# Patient Record
Sex: FEMALE | Race: WHITE | Employment: UNEMPLOYED | URBAN - METROPOLITAN AREA
[De-identification: names, ages, dates, MRNs, and addresses within clinical notes are randomized per-mention and may not be internally consistent; named-entity substitution may affect disease eponyms.]

---

## 2017-02-20 RX ORDER — LEVOTHYROXINE SODIUM 0.07 MG/1
TABLET ORAL
Qty: 30 TABLET | Refills: 2 | Status: SHIPPED | OUTPATIENT
Start: 2017-02-20 | End: 2017-05-24 | Stop reason: SDUPTHER

## 2017-05-24 RX ORDER — LEVOTHYROXINE SODIUM 0.07 MG/1
TABLET ORAL
Qty: 30 TABLET | Refills: 2 | Status: SHIPPED | OUTPATIENT
Start: 2017-05-24 | End: 2017-08-22 | Stop reason: SDUPTHER

## 2017-06-06 ENCOUNTER — OFFICE VISIT (OUTPATIENT)
Dept: FAMILY MEDICINE CLINIC | Age: 57
End: 2017-06-06

## 2017-06-06 VITALS
HEART RATE: 76 BPM | WEIGHT: 138.8 LBS | SYSTOLIC BLOOD PRESSURE: 124 MMHG | OXYGEN SATURATION: 99 % | DIASTOLIC BLOOD PRESSURE: 72 MMHG | HEIGHT: 63 IN | BODY MASS INDEX: 24.59 KG/M2

## 2017-06-06 DIAGNOSIS — E03.9 HYPOTHYROIDISM, UNSPECIFIED TYPE: Primary | ICD-10-CM

## 2017-06-06 DIAGNOSIS — R53.1 WEAKNESS: ICD-10-CM

## 2017-06-06 PROCEDURE — 36415 COLL VENOUS BLD VENIPUNCTURE: CPT | Performed by: FAMILY MEDICINE

## 2017-06-06 PROCEDURE — 99214 OFFICE O/P EST MOD 30 MIN: CPT | Performed by: FAMILY MEDICINE

## 2017-06-07 LAB
ESTIMATED AVERAGE GLUCOSE: 116.9 MG/DL
HBA1C MFR BLD: 5.7 %
T4 FREE: 1.5 NG/DL (ref 0.9–1.8)
TSH REFLEX FT4: 1 UIU/ML (ref 0.27–4.2)

## 2017-08-22 RX ORDER — LEVOTHYROXINE SODIUM 0.07 MG/1
TABLET ORAL
Qty: 30 TABLET | Refills: 1 | Status: SHIPPED | OUTPATIENT
Start: 2017-08-22 | End: 2017-10-18 | Stop reason: SDUPTHER

## 2017-10-18 RX ORDER — LEVOTHYROXINE SODIUM 0.07 MG/1
TABLET ORAL
Qty: 30 TABLET | Refills: 1 | Status: SHIPPED | OUTPATIENT
Start: 2017-10-18 | End: 2017-12-15 | Stop reason: SDUPTHER

## 2017-10-18 NOTE — TELEPHONE ENCOUNTER
Patient called and is requesting a refill on the Levothyroxine. Please call patient when rx is sent to [address] [place] [address] in [facility]. Patient is aware that Dr. Alanna Hood is not in the office today.     Thanks

## 2017-12-15 RX ORDER — LEVOTHYROXINE SODIUM 0.07 MG/1
TABLET ORAL
Qty: 30 TABLET | Refills: 5 | Status: SHIPPED | OUTPATIENT
Start: 2017-12-15 | End: 2018-06-15 | Stop reason: SDUPTHER

## 2018-06-18 RX ORDER — LEVOTHYROXINE SODIUM 0.07 MG/1
TABLET ORAL
Qty: 30 TABLET | Refills: 1 | Status: SHIPPED | OUTPATIENT
Start: 2018-06-18 | End: 2018-08-13 | Stop reason: SDUPTHER

## 2018-07-02 ENCOUNTER — TELEPHONE (OUTPATIENT)
Dept: FAMILY MEDICINE CLINIC | Age: 58
End: 2018-07-02

## 2018-07-02 ENCOUNTER — OFFICE VISIT (OUTPATIENT)
Dept: FAMILY MEDICINE CLINIC | Age: 58
End: 2018-07-02

## 2018-07-02 VITALS
WEIGHT: 134 LBS | SYSTOLIC BLOOD PRESSURE: 112 MMHG | TEMPERATURE: 97.4 F | HEART RATE: 80 BPM | BODY MASS INDEX: 23.74 KG/M2 | DIASTOLIC BLOOD PRESSURE: 70 MMHG

## 2018-07-02 DIAGNOSIS — R20.8 LOCALIZED SKIN TENDERNESS: ICD-10-CM

## 2018-07-02 DIAGNOSIS — E03.9 HYPOTHYROIDISM, UNSPECIFIED TYPE: ICD-10-CM

## 2018-07-02 DIAGNOSIS — F32.A DEPRESSION, UNSPECIFIED DEPRESSION TYPE: ICD-10-CM

## 2018-07-02 DIAGNOSIS — L30.9 DERMATITIS: ICD-10-CM

## 2018-07-02 DIAGNOSIS — Z86.19 HISTORY OF SHINGLES: Primary | ICD-10-CM

## 2018-07-02 PROCEDURE — 99214 OFFICE O/P EST MOD 30 MIN: CPT | Performed by: NURSE PRACTITIONER

## 2018-07-02 RX ORDER — TRIAMCINOLONE ACETONIDE 55 UG/1
2 SPRAY, METERED NASAL DAILY
COMMUNITY
End: 2022-10-27

## 2018-07-02 RX ORDER — VALACYCLOVIR HYDROCHLORIDE 1 G/1
1000 TABLET, FILM COATED ORAL 3 TIMES DAILY
Qty: 21 TABLET | Refills: 2 | Status: SHIPPED | OUTPATIENT
Start: 2018-07-02 | End: 2018-07-09

## 2018-07-02 RX ORDER — DESONIDE 0.5 MG/G
CREAM TOPICAL
Qty: 15 G | Refills: 1 | Status: SHIPPED | OUTPATIENT
Start: 2018-07-02 | End: 2019-04-30 | Stop reason: SDUPTHER

## 2018-07-02 NOTE — TELEPHONE ENCOUNTER
Patient is calling stating she is getting shingles around her left rib area, painful and sore to touch, patient wants to know if ou can call in some pain medication

## 2018-07-03 LAB — TSH SERPL DL<=0.05 MIU/L-ACNC: 0.8 UIU/ML (ref 0.27–4.2)

## 2018-07-09 ENCOUNTER — TELEPHONE (OUTPATIENT)
Dept: FAMILY MEDICINE CLINIC | Age: 58
End: 2018-07-09

## 2018-07-09 NOTE — TELEPHONE ENCOUNTER
Patient is calling wanting to know if she should continue the valtrex that was prescribed last week for her shingles? She is finished with the script bit it has refills? Should she get it refilled?  She states she is feels some what better

## 2018-08-13 DIAGNOSIS — E03.9 HYPOTHYROIDISM, UNSPECIFIED TYPE: Primary | ICD-10-CM

## 2018-08-13 RX ORDER — LEVOTHYROXINE SODIUM 0.07 MG/1
TABLET ORAL
Qty: 30 TABLET | Refills: 1 | Status: SHIPPED | OUTPATIENT
Start: 2018-08-13 | End: 2018-08-14 | Stop reason: SDUPTHER

## 2018-08-14 RX ORDER — LEVOTHYROXINE SODIUM 0.07 MG/1
TABLET ORAL
Qty: 30 TABLET | Refills: 1 | Status: SHIPPED | OUTPATIENT
Start: 2018-08-14 | End: 2018-10-15 | Stop reason: SDUPTHER

## 2018-10-15 DIAGNOSIS — E03.9 HYPOTHYROIDISM, UNSPECIFIED TYPE: ICD-10-CM

## 2018-10-15 RX ORDER — LEVOTHYROXINE SODIUM 0.07 MG/1
TABLET ORAL
Qty: 30 TABLET | Refills: 1 | Status: SHIPPED | OUTPATIENT
Start: 2018-10-15 | End: 2018-12-10 | Stop reason: SDUPTHER

## 2018-10-15 NOTE — TELEPHONE ENCOUNTER
Patient called and is requesting a refill on her prescription of Levothyroxine 75 mcgs. Please send rx to [facility] in [facility].       Last refilled   08.14.18  Last seen     07.02.18    Next appt     Not scheduled

## 2018-12-10 DIAGNOSIS — E03.9 HYPOTHYROIDISM, UNSPECIFIED TYPE: ICD-10-CM

## 2018-12-10 RX ORDER — LEVOTHYROXINE SODIUM 0.07 MG/1
TABLET ORAL
Qty: 30 TABLET | Refills: 3 | Status: SHIPPED | OUTPATIENT
Start: 2018-12-10 | End: 2019-04-08 | Stop reason: SDUPTHER

## 2018-12-10 NOTE — TELEPHONE ENCOUNTER
Patient called and is requesting a refill on her prescription of Levothyroxine 75 mcg. Patient would like the prescription sent to [facility] in [facility]. Patient is aware of the 24 to 48 hour turnaround time on prescription refills.       Last refilled    10.15.18  Last seen       07.02.18

## 2019-03-21 ENCOUNTER — TELEPHONE (OUTPATIENT)
Dept: FAMILY MEDICINE CLINIC | Age: 59
End: 2019-03-21

## 2019-03-21 DIAGNOSIS — D22.9 SKIN MOLE: ICD-10-CM

## 2019-03-21 DIAGNOSIS — L30.9 ACUTE ECZEMA: ICD-10-CM

## 2019-03-21 DIAGNOSIS — L30.9 DERMATITIS: Primary | ICD-10-CM

## 2019-03-26 ENCOUNTER — HOSPITAL ENCOUNTER (OUTPATIENT)
Dept: MAMMOGRAPHY | Age: 59
Discharge: HOME | End: 2019-03-26
Payer: COMMERCIAL

## 2019-03-26 DIAGNOSIS — Z12.31 ENCOUNTER FOR SCREENING MAMMOGRAM FOR BREAST CANCER: ICD-10-CM

## 2019-03-26 PROCEDURE — 77063 BREAST TOMOSYNTHESIS BI: CPT

## 2019-04-02 ENCOUNTER — TELEPHONE (OUTPATIENT)
Dept: FAMILY MEDICINE CLINIC | Age: 59
End: 2019-04-02

## 2019-04-08 DIAGNOSIS — E03.9 HYPOTHYROIDISM, UNSPECIFIED TYPE: ICD-10-CM

## 2019-04-08 RX ORDER — LEVOTHYROXINE SODIUM 0.07 MG/1
TABLET ORAL
Qty: 30 TABLET | Refills: 3 | Status: SHIPPED | OUTPATIENT
Start: 2019-04-08 | End: 2019-08-05 | Stop reason: SDUPTHER

## 2019-04-08 NOTE — TELEPHONE ENCOUNTER
Patient request for Levothyroxine 75 mcgs. Please send to [address] in [facility].

## 2019-04-15 ENCOUNTER — TELEPHONE (OUTPATIENT)
Dept: FAMILY MEDICINE CLINIC | Age: 59
End: 2019-04-15

## 2019-04-15 NOTE — TELEPHONE ENCOUNTER
Patient called and is requesting that [facility] call her. Patient states that the dermatologist Dorina Benavidez is requesting additional information regarding the mole that the patient needs removed. Patient wants to discuss this with [facility].

## 2019-04-30 ENCOUNTER — OFFICE VISIT (OUTPATIENT)
Dept: FAMILY MEDICINE CLINIC | Age: 59
End: 2019-04-30
Payer: COMMERCIAL

## 2019-04-30 VITALS
HEART RATE: 85 BPM | WEIGHT: 136 LBS | OXYGEN SATURATION: 98 % | BODY MASS INDEX: 24.1 KG/M2 | SYSTOLIC BLOOD PRESSURE: 112 MMHG | DIASTOLIC BLOOD PRESSURE: 60 MMHG | HEIGHT: 63 IN

## 2019-04-30 DIAGNOSIS — L30.9 DERMATITIS: ICD-10-CM

## 2019-04-30 DIAGNOSIS — L30.9 ECZEMA, UNSPECIFIED TYPE: ICD-10-CM

## 2019-04-30 DIAGNOSIS — D22.9 NEVUS, ATYPICAL: Primary | ICD-10-CM

## 2019-04-30 PROCEDURE — 99213 OFFICE O/P EST LOW 20 MIN: CPT | Performed by: FAMILY MEDICINE

## 2019-04-30 PROCEDURE — 11307 SHAVE SKIN LESION 1.1-2.0 CM: CPT | Performed by: FAMILY MEDICINE

## 2019-04-30 RX ORDER — DESONIDE 0.5 MG/G
CREAM TOPICAL
Qty: 15 G | Refills: 1 | Status: SHIPPED | OUTPATIENT
Start: 2019-04-30 | End: 2020-12-14

## 2019-04-30 RX ORDER — TRIAMCINOLONE ACETONIDE 1 MG/G
CREAM TOPICAL
Qty: 60 G | Refills: 0 | Status: SHIPPED | OUTPATIENT
Start: 2019-04-30 | End: 2020-11-06

## 2019-05-08 NOTE — PROGRESS NOTES
2019     Remi Rockwell (:  1960) is a 63 y.o. female, here for evaluation of the following medical concerns:    Remi Rockwell is a 63 y.o. female. Patient presents with:  Mole        The patients PMH, surgical history, family history, medications, allergies were all reviewed and updated as appropriate today. Rash   This is a chronic problem. The current episode started more than 1 year ago. The problem is unchanged. The rash is diffuse. She was exposed to nothing. Pertinent negatives include no congestion. Past treatments include nothing. The treatment provided no relief. Her past medical history is significant for eczema. Review of Systems   HENT: Negative for congestion. Skin: Positive for rash. Prior to Visit Medications    Medication Sig Taking? Authorizing Provider   desonide (DESOWEN) 0.05 % cream Apply topically 2 times daily to patch on scalp Yes Kennedy Holman MD   triamcinolone (KENALOG) 0.1 % cream Apply topically 2 times daily. Yes Kennedy Holman MD   levothyroxine (SYNTHROID) 75 MCG tablet TAKE ONE TABLET BY MOUTH DAILY Yes Kennedy Holman MD   triamcinolone (NASACORT ALLERGY 24HR) 55 MCG/ACT nasal inhaler 2 sprays by Nasal route daily Yes Historical Provider, MD   sertraline (ZOLOFT) 50 MG tablet Take 1 tablet by mouth nightly 1/2 tab for first 7 days then one daily after  MICHEL Killian - LIZETTE        Social History     Tobacco Use    Smoking status: Never Smoker    Smokeless tobacco: Never Used   Substance Use Topics    Alcohol use: No        Vitals:    19 1432   BP: 112/60   Pulse: 85   SpO2: 98%   Weight: 136 lb (61.7 kg)   Height: 5' 3\" (1.6 m)     Estimated body mass index is 24.09 kg/m² as calculated from the following:    Height as of this encounter: 5' 3\" (1.6 m). Weight as of this encounter: 136 lb (61.7 kg). Physical Exam   Constitutional: She is oriented to person, place, and time.  She appears well-developed and signs of infection were reviewed. 3. Recommended that the patient use OTC acetaminophen as needed for pain. 4. Return in 6 weeks. ASSESSMENT/PLAN:  1. Dermatitis    - desonide (DESOWEN) 0.05 % cream; Apply topically 2 times daily to patch on scalp  Dispense: 15 g; Refill: 1    2. Eczema    - triamcinolone (KENALOG) 0.1 % cream; Apply topically 2 times daily. Dispense: 60 g; Refill: 0    3. Nevus, atypical    - Surgical Pathology      No follow-ups on file. An electronic signature was used to authenticate this note.     --Prabha Bean MD on 5/8/2019 at 8:59 AM

## 2019-08-05 ENCOUNTER — TELEPHONE (OUTPATIENT)
Dept: FAMILY MEDICINE CLINIC | Age: 59
End: 2019-08-05

## 2019-08-05 DIAGNOSIS — E03.9 HYPOTHYROIDISM, UNSPECIFIED TYPE: ICD-10-CM

## 2019-08-05 RX ORDER — LEVOTHYROXINE SODIUM 0.07 MG/1
TABLET ORAL
Qty: 30 TABLET | Refills: 0 | Status: SHIPPED | OUTPATIENT
Start: 2019-08-05 | End: 2019-09-04 | Stop reason: SDUPTHER

## 2019-08-14 DIAGNOSIS — F32.A DEPRESSION, UNSPECIFIED DEPRESSION TYPE: ICD-10-CM

## 2019-09-04 RX ORDER — LEVOTHYROXINE SODIUM 0.07 MG/1
TABLET ORAL
Qty: 30 TABLET | Refills: 1 | Status: SHIPPED | OUTPATIENT
Start: 2019-09-04 | End: 2019-11-07 | Stop reason: SDUPTHER

## 2019-11-07 RX ORDER — LEVOTHYROXINE SODIUM 0.07 MG/1
TABLET ORAL
Qty: 30 TABLET | Refills: 11 | Status: SHIPPED | OUTPATIENT
Start: 2019-11-07 | End: 2020-11-02 | Stop reason: SDUPTHER

## 2019-11-07 NOTE — TELEPHONE ENCOUNTER
Patient requesting a refill on Synthroid    [address] seen:  4.30.19  Future visit:  12.11.19

## 2019-12-11 ENCOUNTER — OFFICE VISIT (OUTPATIENT)
Dept: FAMILY MEDICINE CLINIC | Age: 59
End: 2019-12-11
Payer: COMMERCIAL

## 2019-12-11 VITALS
HEART RATE: 87 BPM | WEIGHT: 135 LBS | BODY MASS INDEX: 23.91 KG/M2 | OXYGEN SATURATION: 98 % | DIASTOLIC BLOOD PRESSURE: 64 MMHG | SYSTOLIC BLOOD PRESSURE: 122 MMHG

## 2019-12-11 DIAGNOSIS — Z00.00 WELL ADULT EXAM: Primary | ICD-10-CM

## 2019-12-11 DIAGNOSIS — Z12.11 SCREENING FOR COLON CANCER: ICD-10-CM

## 2019-12-11 DIAGNOSIS — Z23 NEED FOR PROPHYLACTIC VACCINATION AND INOCULATION AGAINST INFLUENZA: ICD-10-CM

## 2019-12-11 LAB
A/G RATIO: 1.6 (ref 1.1–2.2)
ALBUMIN SERPL-MCNC: 4.5 G/DL (ref 3.4–5)
ALP BLD-CCNC: 67 U/L (ref 40–129)
ALT SERPL-CCNC: 22 U/L (ref 10–40)
ANION GAP SERPL CALCULATED.3IONS-SCNC: 14 MMOL/L (ref 3–16)
AST SERPL-CCNC: 19 U/L (ref 15–37)
BILIRUB SERPL-MCNC: 1 MG/DL (ref 0–1)
BUN BLDV-MCNC: 15 MG/DL (ref 7–20)
CALCIUM SERPL-MCNC: 9.9 MG/DL (ref 8.3–10.6)
CHLORIDE BLD-SCNC: 104 MMOL/L (ref 99–110)
CHOLESTEROL, TOTAL: 195 MG/DL (ref 0–199)
CO2: 27 MMOL/L (ref 21–32)
CREAT SERPL-MCNC: 0.7 MG/DL (ref 0.6–1.1)
GFR AFRICAN AMERICAN: >60
GFR NON-AFRICAN AMERICAN: >60
GLOBULIN: 2.8 G/DL
GLUCOSE BLD-MCNC: 90 MG/DL (ref 70–99)
HDLC SERPL-MCNC: 52 MG/DL (ref 40–60)
LDL CHOLESTEROL CALCULATED: 130 MG/DL
POTASSIUM SERPL-SCNC: 4.3 MMOL/L (ref 3.5–5.1)
SODIUM BLD-SCNC: 145 MMOL/L (ref 136–145)
TOTAL PROTEIN: 7.3 G/DL (ref 6.4–8.2)
TRIGL SERPL-MCNC: 63 MG/DL (ref 0–150)
TSH REFLEX: 0.47 UIU/ML (ref 0.27–4.2)
VLDLC SERPL CALC-MCNC: 13 MG/DL

## 2019-12-11 PROCEDURE — 36415 COLL VENOUS BLD VENIPUNCTURE: CPT | Performed by: FAMILY MEDICINE

## 2019-12-11 PROCEDURE — 90471 IMMUNIZATION ADMIN: CPT | Performed by: FAMILY MEDICINE

## 2019-12-11 PROCEDURE — 90686 IIV4 VACC NO PRSV 0.5 ML IM: CPT | Performed by: FAMILY MEDICINE

## 2019-12-11 PROCEDURE — 99396 PREV VISIT EST AGE 40-64: CPT | Performed by: FAMILY MEDICINE

## 2019-12-11 ASSESSMENT — ENCOUNTER SYMPTOMS
SHORTNESS OF BREATH: 0
EYE PAIN: 0
DIARRHEA: 0
TROUBLE SWALLOWING: 0
BACK PAIN: 0
COLOR CHANGE: 0
CHEST TIGHTNESS: 0
CONSTIPATION: 0
RHINORRHEA: 0

## 2019-12-11 ASSESSMENT — PATIENT HEALTH QUESTIONNAIRE - PHQ9
SUM OF ALL RESPONSES TO PHQ QUESTIONS 1-9: 1
2. FEELING DOWN, DEPRESSED OR HOPELESS: 1
1. LITTLE INTEREST OR PLEASURE IN DOING THINGS: 0
SUM OF ALL RESPONSES TO PHQ9 QUESTIONS 1 & 2: 1
SUM OF ALL RESPONSES TO PHQ QUESTIONS 1-9: 1

## 2019-12-12 LAB
ESTIMATED AVERAGE GLUCOSE: 108.3 MG/DL
HBA1C MFR BLD: 5.4 %

## 2020-04-10 ENCOUNTER — TELEPHONE (OUTPATIENT)
Dept: FAMILY MEDICINE CLINIC | Age: 60
End: 2020-04-10

## 2020-04-10 RX ORDER — SULFAMETHOXAZOLE AND TRIMETHOPRIM 800; 160 MG/1; MG/1
1 TABLET ORAL 2 TIMES DAILY
Qty: 14 TABLET | Refills: 0 | Status: SHIPPED | OUTPATIENT
Start: 2020-04-10 | End: 2020-04-17

## 2020-04-24 ENCOUNTER — VIRTUAL VISIT (OUTPATIENT)
Dept: FAMILY MEDICINE CLINIC | Age: 60
End: 2020-04-24
Payer: COMMERCIAL

## 2020-04-24 ENCOUNTER — PATIENT MESSAGE (OUTPATIENT)
Dept: FAMILY MEDICINE CLINIC | Age: 60
End: 2020-04-24

## 2020-04-24 VITALS — BODY MASS INDEX: 23.04 KG/M2 | HEIGHT: 63 IN | WEIGHT: 130 LBS

## 2020-04-24 PROCEDURE — 99213 OFFICE O/P EST LOW 20 MIN: CPT | Performed by: NURSE PRACTITIONER

## 2020-04-24 RX ORDER — PREDNISONE 20 MG/1
TABLET ORAL
Qty: 18 TABLET | Refills: 0 | Status: SHIPPED | OUTPATIENT
Start: 2020-04-24 | End: 2020-11-06

## 2020-04-24 ASSESSMENT — PATIENT HEALTH QUESTIONNAIRE - PHQ9
2. FEELING DOWN, DEPRESSED OR HOPELESS: 0
SUM OF ALL RESPONSES TO PHQ QUESTIONS 1-9: 0
1. LITTLE INTEREST OR PLEASURE IN DOING THINGS: 0
SUM OF ALL RESPONSES TO PHQ QUESTIONS 1-9: 0
SUM OF ALL RESPONSES TO PHQ9 QUESTIONS 1 & 2: 0

## 2020-04-24 ASSESSMENT — ENCOUNTER SYMPTOMS
EYES NEGATIVE: 1
RESPIRATORY NEGATIVE: 1
COLOR CHANGE: 0

## 2020-04-24 NOTE — PATIENT INSTRUCTIONS
lightheaded or suddenly feel weak, confused, or restless.    Call your doctor now or seek immediate medical care if:    · You have symptoms of an allergic reaction, such as:  ? A rash or hives (raised, red areas on the skin). ? Itching. ? Swelling. ? Belly pain, nausea, or vomiting.     · You get hives after you start a new medicine.     · Hives have not gone away after 24 hours.    Watch closely for changes in your health, and be sure to contact your doctor if:    · You do not get better as expected. Where can you learn more? Go to [email or web address] and sign in to your [employer] account. Enter A767 in the FiFully box to learn more about \"Hives: Care Instructions. \"     If you do not have an account, please click on the \"Sign Up Now\" link. Current as of: June 26, 2019Content Version: 12.4  © 1082-0044 Healthwise, Incorporated. Care instructions adapted under license by [facility] ([facility]). If you have questions about a medical condition or this instruction, always ask your healthcare professional. [place] [address] any warranty or liability for your use of this information.

## 2020-11-02 RX ORDER — LEVOTHYROXINE SODIUM 0.07 MG/1
TABLET ORAL
Qty: 30 TABLET | Refills: 2 | Status: SHIPPED | OUTPATIENT
Start: 2020-11-02 | End: 2021-01-27 | Stop reason: SDUPTHER

## 2020-11-06 ENCOUNTER — OFFICE VISIT (OUTPATIENT)
Dept: FAMILY MEDICINE CLINIC | Age: 60
End: 2020-11-06
Payer: COMMERCIAL

## 2020-11-06 VITALS
DIASTOLIC BLOOD PRESSURE: 70 MMHG | HEART RATE: 72 BPM | BODY MASS INDEX: 24.09 KG/M2 | OXYGEN SATURATION: 98 % | WEIGHT: 136 LBS | TEMPERATURE: 97.3 F | SYSTOLIC BLOOD PRESSURE: 116 MMHG

## 2020-11-06 LAB
BILIRUBIN, POC: NEGATIVE
BLOOD URINE, POC: NORMAL
CLARITY, POC: CLEAR
COLOR, POC: YELLOW
GLUCOSE URINE, POC: NEGATIVE
KETONES, POC: NEGATIVE
LEUKOCYTE EST, POC: NEGATIVE
NITRITE, POC: NEGATIVE
PH, POC: 5
PROTEIN, POC: NEGATIVE
SPECIFIC GRAVITY, POC: >=1.03
UROBILINOGEN, POC: NORMAL

## 2020-11-06 PROCEDURE — 81002 URINALYSIS NONAUTO W/O SCOPE: CPT | Performed by: NURSE PRACTITIONER

## 2020-11-06 PROCEDURE — 90471 IMMUNIZATION ADMIN: CPT | Performed by: NURSE PRACTITIONER

## 2020-11-06 PROCEDURE — 90688 IIV4 VACCINE SPLT 0.5 ML IM: CPT | Performed by: NURSE PRACTITIONER

## 2020-11-06 PROCEDURE — 99213 OFFICE O/P EST LOW 20 MIN: CPT | Performed by: NURSE PRACTITIONER

## 2020-11-06 RX ORDER — CEPHALEXIN 500 MG/1
500 CAPSULE ORAL 2 TIMES DAILY
Qty: 14 CAPSULE | Refills: 0 | Status: SHIPPED | OUTPATIENT
Start: 2020-11-06 | End: 2020-11-13

## 2020-11-06 RX ORDER — TRIAMCINOLONE ACETONIDE 5 MG/G
CREAM TOPICAL
Qty: 15 G | Refills: 1 | Status: SHIPPED | OUTPATIENT
Start: 2020-11-06 | End: 2020-11-13

## 2020-11-06 RX ORDER — TAMSULOSIN HYDROCHLORIDE 0.4 MG/1
0.4 CAPSULE ORAL DAILY
Qty: 30 CAPSULE | Refills: 0 | Status: SHIPPED | OUTPATIENT
Start: 2020-11-06 | End: 2020-12-14

## 2020-11-06 ASSESSMENT — ENCOUNTER SYMPTOMS
SHORTNESS OF BREATH: 0
GASTROINTESTINAL NEGATIVE: 1
COUGH: 0
WHEEZING: 0

## 2020-11-06 NOTE — PROGRESS NOTES
2020     Mary Salmeron (:  1960) is a 63 y.o. female, here for evaluation of the following medical concerns:    HPI  Burning on her lower right groin with urination. She states she has had some low back pain for a couple days. She states the pain starts on her mid right side of her back and travels around to the front of her abdomen. She states that is were it burns. She denies urinary frequency or urgency. She feels she may be going to the bathroom more but she is trying to \"flush\" her system. She denies any history of kidney stone but reports she has been told she might have one. Dermatitis. She has concerns with a dry patch on the back of her neck on her hair line. She has been using some cream given by Dr. Arnold Monteiro but she is not sure of the name of it. It helps some but states it is there constantly. Review of Systems   Constitutional: Negative. HENT: Negative. Respiratory: Negative for cough, shortness of breath and wheezing. Cardiovascular: Negative. Gastrointestinal: Negative. Genitourinary: Positive for dysuria and frequency. Negative for urgency. Musculoskeletal: Negative. Skin: Positive for rash. Neurological: Negative. Psychiatric/Behavioral: Negative. Prior to Visit Medications    Medication Sig Taking? Authorizing Provider   triamcinolone (ARISTOCORT) 0.5 % cream Apply topically 3 times daily.  Yes MICHEL Osman CNP   tamsulosin (FLOMAX) 0.4 MG capsule Take 1 capsule by mouth daily Yes MICHEL Osman CNP   cephALEXin (KEFLEX) 500 MG capsule Take 1 capsule by mouth 2 times daily for 7 days Yes MICHEL Osman CNP   levothyroxine (SYNTHROID) 75 MCG tablet TAKE ONE TABLET BY MOUTH DAILY Yes Cindi Rich MD   desonide (DESOWEN) 0.05 % cream Apply topically 2 times daily to patch on scalp Yes Cindi Rich MD   triamcinolone (NASACORT ALLERGY 24HR) 55 MCG/ACT nasal inhaler 2 sprays by Nasal route daily Yes Historical Provider, MD   sertraline (ZOLOFT) 50 MG tablet ONE TABLET BY MOUTH NIGHTLY  Patient not taking: Reported on 11/6/2020  Gris Pink MD        Social History     Tobacco Use    Smoking status: Never Smoker    Smokeless tobacco: Never Used   Substance Use Topics    Alcohol use: No        Vitals:    11/06/20 1301   BP: 116/70   Pulse: 72   Temp: 97.3 °F (36.3 °C)   TempSrc: Temporal   SpO2: 98%   Weight: 136 lb (61.7 kg)     Estimated body mass index is 24.09 kg/m² as calculated from the following:    Height as of 4/24/20: 5' 3\" (1.6 m). Weight as of this encounter: 136 lb (61.7 kg). Physical Exam  Vitals signs reviewed. Abdominal:      Tenderness: There is right CVA tenderness. Musculoskeletal:        Arms:    Skin:     General: Skin is warm and dry. Neurological:      Mental Status: She is alert. ASSESSMENT/PLAN:  1. Right flank pain  I advised patient I think this is a kidney stone. I recommended a CT scan but patient declined for now. She wanted to try the medications and see if it would pass on its own. Urine dip positive for blood. Will send culture and treat with antibiotics for possible infection along with flomax. She wants to take Ibuprofen for pain as needed. Patient advised is pain worsens or symptoms worsen to be seen at the ER for possible stone. Patient was informed of potential for the stone to block the kidney and cause serious complications. She declined the scan for now. - Culture, Urine  - tamsulosin (FLOMAX) 0.4 MG capsule; Take 1 capsule by mouth daily  Dispense: 30 capsule; Refill: 0  - cephALEXin (KEFLEX) 500 MG capsule; Take 1 capsule by mouth 2 times daily for 7 days  Dispense: 14 capsule; Refill: 0    2. Dysuria  See above, urine positive for moderate blood. - POCT Urinalysis no Micro  - Culture, Urine  - cephALEXin (KEFLEX) 500 MG capsule; Take 1 capsule by mouth 2 times daily for 7 days  Dispense: 14 capsule; Refill: 0    3.  Dermatitis  I sent in triamcinolone 0.5% cream and she was advised to apply cetaphil cream twice daily as well. Follow up if no improvement. 4. Need for vaccination  - INFLUENZA, QUADV, 3 YRS AND OLDER, IM, MDV, 0.5ML (Ab Sergeant)          An electronic signature was used to authenticate this note.     --MICHEL Montalvo - CNP on 11/6/2020 at 3:04 PM

## 2020-11-08 LAB — URINE CULTURE, ROUTINE: NORMAL

## 2020-11-09 ENCOUNTER — HOSPITAL ENCOUNTER (OUTPATIENT)
Dept: CT IMAGING | Age: 60
Discharge: HOME | End: 2020-11-09
Payer: COMMERCIAL

## 2020-11-09 ENCOUNTER — TELEPHONE (OUTPATIENT)
Dept: FAMILY MEDICINE CLINIC | Age: 60
End: 2020-11-09

## 2020-11-09 PROCEDURE — 74176 CT ABD & PELVIS W/O CONTRAST: CPT

## 2020-11-09 NOTE — TELEPHONE ENCOUNTER
David Olivo is calling with a report of the patients CT scan.     Please return her call   [phone]

## 2020-11-09 NOTE — TELEPHONE ENCOUNTER
Bautista Roberson from [facility] called and needs clarification on the orders sent for the patient.  He said this is usually sent with out contrast.    Please call Bautista Roberson at [phone]

## 2020-12-14 ENCOUNTER — OFFICE VISIT (OUTPATIENT)
Dept: FAMILY MEDICINE CLINIC | Age: 60
End: 2020-12-14
Payer: COMMERCIAL

## 2020-12-14 VITALS
HEART RATE: 74 BPM | WEIGHT: 136.6 LBS | OXYGEN SATURATION: 98 % | TEMPERATURE: 97.3 F | BODY MASS INDEX: 24.2 KG/M2 | DIASTOLIC BLOOD PRESSURE: 77 MMHG | SYSTOLIC BLOOD PRESSURE: 122 MMHG

## 2020-12-14 LAB
A/G RATIO: 2 (ref 1.1–2.2)
ALBUMIN SERPL-MCNC: 4.5 G/DL (ref 3.4–5)
ALP BLD-CCNC: 73 U/L (ref 40–129)
ALT SERPL-CCNC: 18 U/L (ref 10–40)
ANION GAP SERPL CALCULATED.3IONS-SCNC: 8 MMOL/L (ref 3–16)
AST SERPL-CCNC: 18 U/L (ref 15–37)
BILIRUB SERPL-MCNC: 1 MG/DL (ref 0–1)
BUN BLDV-MCNC: 14 MG/DL (ref 7–20)
CALCIUM SERPL-MCNC: 9.7 MG/DL (ref 8.3–10.6)
CHLORIDE BLD-SCNC: 105 MMOL/L (ref 99–110)
CHOLESTEROL, FASTING: 192 MG/DL (ref 0–199)
CO2: 30 MMOL/L (ref 21–32)
CREAT SERPL-MCNC: 0.6 MG/DL (ref 0.6–1.1)
GFR AFRICAN AMERICAN: >60
GFR NON-AFRICAN AMERICAN: >60
GLOBULIN: 2.3 G/DL
GLUCOSE BLD-MCNC: 87 MG/DL (ref 70–99)
HDLC SERPL-MCNC: 52 MG/DL (ref 40–60)
LDL CHOLESTEROL CALCULATED: 127 MG/DL
POTASSIUM SERPL-SCNC: 4.5 MMOL/L (ref 3.5–5.1)
SODIUM BLD-SCNC: 143 MMOL/L (ref 136–145)
TOTAL PROTEIN: 6.8 G/DL (ref 6.4–8.2)
TRIGLYCERIDE, FASTING: 65 MG/DL (ref 0–150)
TSH REFLEX: 0.86 UIU/ML (ref 0.27–4.2)
VLDLC SERPL CALC-MCNC: 13 MG/DL

## 2020-12-14 PROCEDURE — 99396 PREV VISIT EST AGE 40-64: CPT | Performed by: FAMILY MEDICINE

## 2020-12-14 PROCEDURE — 36415 COLL VENOUS BLD VENIPUNCTURE: CPT | Performed by: FAMILY MEDICINE

## 2020-12-14 RX ORDER — TRIAMCINOLONE ACETONIDE 5 MG/G
CREAM TOPICAL
COMMUNITY
End: 2022-10-27

## 2020-12-14 RX ORDER — TRIAMCINOLONE ACETONIDE 1 MG/G
CREAM TOPICAL
COMMUNITY
End: 2022-10-27

## 2020-12-14 ASSESSMENT — ENCOUNTER SYMPTOMS
EYE PAIN: 0
RHINORRHEA: 0
COLOR CHANGE: 0
DIARRHEA: 0
SHORTNESS OF BREATH: 0
CHEST TIGHTNESS: 0
TROUBLE SWALLOWING: 0
BACK PAIN: 0
CONSTIPATION: 0

## 2020-12-14 NOTE — PROGRESS NOTES
2020    Abimael Johnston (:  1960) is a 62 y.o. female, here for a preventive medicine evaluation. Patient Active Problem List   Diagnosis    Hypothyroidism    Stress incontinence    Eustachian tube dysfunction    Low back pain       Review of Systems   Constitutional: Negative for fatigue and unexpected weight change. HENT: Negative for congestion, rhinorrhea and trouble swallowing. Eyes: Negative for pain and visual disturbance. Respiratory: Negative for chest tightness and shortness of breath. Cardiovascular: Negative for chest pain and palpitations. Gastrointestinal: Negative for constipation and diarrhea. Endocrine: Negative for cold intolerance and heat intolerance. Genitourinary: Negative for frequency and urgency. Musculoskeletal: Negative for arthralgias and back pain. Skin: Negative for color change and rash. Allergic/Immunologic: Negative for environmental allergies and food allergies. Neurological: Negative for dizziness and light-headedness. Hematological: Negative for adenopathy. Does not bruise/bleed easily. Psychiatric/Behavioral: Negative for dysphoric mood and sleep disturbance. Prior to Visit Medications    Medication Sig Taking?  Authorizing Provider   triamcinolone (ARISTOCORT) 0.5 % cream triamcinolone acetonide 0.5 % topical cream Yes Historical Provider, MD   triamcinolone (KENALOG) 0.1 % cream triamcinolone acetonide 0.1 % topical cream Yes Historical Provider, MD   levothyroxine (SYNTHROID) 75 MCG tablet TAKE ONE TABLET BY MOUTH DAILY Yes Sol Womack MD   triamcinolone (NASACORT ALLERGY 24HR) 55 MCG/ACT nasal inhaler 2 sprays by Nasal route daily Yes Historical Provider, MD        No Known Allergies    Past Medical History:   Diagnosis Date    Allergic rhinitis     Hypothyroidism     Sciatica     Varicose vein        Past Surgical History:   Procedure Laterality Date    CERVICAL FUSION         Social History Socioeconomic History    Marital status:      Spouse name: Not on file    Number of children: Not on file    Years of education: Not on file    Highest education level: Not on file   Occupational History    Not on file   Social Needs    Financial resource strain: Not on file    Food insecurity     Worry: Not on file     Inability: Not on file    Transportation needs     Medical: Not on file     Non-medical: Not on file   Tobacco Use    Smoking status: Never Smoker    Smokeless tobacco: Never Used   Substance and Sexual Activity    Alcohol use: No    Drug use: No    Sexual activity: Yes   Lifestyle    Physical activity     Days per week: Not on file     Minutes per session: Not on file    Stress: Not on file   Relationships    Social connections     Talks on phone: Not on file     Gets together: Not on file     Attends [employer] service: Not on file     Active member of club or organization: Not on file     Attends meetings of clubs or organizations: Not on file     Relationship status: Not on file    Intimate partner violence     Fear of current or ex partner: Not on file     Emotionally abused: Not on file     Physically abused: Not on file     Forced sexual activity: Not on file   Other Topics Concern    Not on file   Social History Narrative    Not on file        Family History   Problem Relation Age of Onset    Heart Disease Father     High Cholesterol Father     High Blood Pressure Father        ADVANCE DIRECTIVE: N, <no information>    Vitals:    12/14/20 1006   BP: 122/77   Pulse: 74   Temp: 97.3 °F (36.3 °C)   SpO2: 98%   Weight: 136 lb 9.6 oz (62 kg)     Estimated body mass index is 24.2 kg/m² as calculated from the following:    Height as of 4/24/20: 5' 3\" (1.6 m). Weight as of this encounter: 136 lb 9.6 oz (62 kg). Physical Exam  Constitutional:       Appearance: She is well-developed. HENT:      Head: Normocephalic and atraumatic.       Right Ear: External ear older Fluzone, Flulaval, Fluarix and 3 yrs and older Afluria) 11/06/2020    Influenza, Quadv, IM, PF (6 mo and older Fluzone, Flulaval, Fluarix, and 3 yrs and older Afluria) 12/11/2019    Tdap (Boostrix, Adacel) 09/17/2012       Health Maintenance   Topic Date Due    Hepatitis C screen  1960    HIV screen  11/14/1976    Cervical cancer screen  11/14/1982    Shingles Vaccine (1 of 2) 11/14/2011    TSH testing  12/11/2020    Breast cancer screen  03/26/2021    DTaP/Tdap/Td vaccine (2 - Td) 09/17/2022    Colon cancer screen fecal DNA test (Cologuard)  12/10/2023    Lipid screen  12/11/2024    Flu vaccine  Completed    Hepatitis A vaccine  Aged Out    Hepatitis B vaccine  Aged Out    Hib vaccine  Aged Out    Meningococcal (ACWY) vaccine  Aged Out    Pneumococcal 0-64 years Vaccine  Aged Out       ASSESSMENT/PLAN:  1.. Well adult exam    - COMPREHENSIVE METABOLIC PANEL  - LIPID PANEL  - HEMOGLOBIN A1C  - TSH with Reflex      No follow-ups on file. An electronic signature was used to authenticate this note.     --Elbert Burgos MD on 12/14/2020 at 10:17 AM

## 2020-12-15 LAB
ESTIMATED AVERAGE GLUCOSE: 105.4 MG/DL
HBA1C MFR BLD: 5.3 %

## 2021-01-27 ENCOUNTER — TELEPHONE (OUTPATIENT)
Dept: FAMILY MEDICINE CLINIC | Age: 61
End: 2021-01-27

## 2021-01-27 DIAGNOSIS — E03.9 HYPOTHYROIDISM, UNSPECIFIED TYPE: ICD-10-CM

## 2021-01-27 RX ORDER — LEVOTHYROXINE SODIUM 0.07 MG/1
TABLET ORAL
Qty: 30 TABLET | Refills: 2 | Status: SHIPPED | OUTPATIENT
Start: 2021-01-27 | End: 2021-04-29 | Stop reason: SDUPTHER

## 2021-01-27 NOTE — TELEPHONE ENCOUNTER
Last seen-12/14/20  Refill request for  levothyroxine (SYNTHROID) 75 MCG tablet  Please send to [facility]

## 2021-04-29 ENCOUNTER — TELEPHONE (OUTPATIENT)
Dept: FAMILY MEDICINE CLINIC | Age: 61
End: 2021-04-29

## 2021-04-29 DIAGNOSIS — E03.9 HYPOTHYROIDISM, UNSPECIFIED TYPE: ICD-10-CM

## 2021-04-29 RX ORDER — LEVOTHYROXINE SODIUM 0.07 MG/1
TABLET ORAL
Qty: 30 TABLET | Refills: 2 | Status: SHIPPED | OUTPATIENT
Start: 2021-04-29 | End: 2021-08-03 | Stop reason: SDUPTHER

## 2021-04-29 NOTE — TELEPHONE ENCOUNTER
Last seen- 12/14/20  Future- none  Refill request   levothyroxine (SYNTHROID) 75 MCG tablet  [facility]

## 2021-07-19 ENCOUNTER — OFFICE VISIT (OUTPATIENT)
Dept: FAMILY MEDICINE CLINIC | Age: 61
End: 2021-07-19
Payer: COMMERCIAL

## 2021-07-19 VITALS
HEART RATE: 73 BPM | HEIGHT: 63 IN | SYSTOLIC BLOOD PRESSURE: 116 MMHG | BODY MASS INDEX: 23.74 KG/M2 | TEMPERATURE: 97.3 F | OXYGEN SATURATION: 98 % | WEIGHT: 134 LBS | DIASTOLIC BLOOD PRESSURE: 68 MMHG

## 2021-07-19 DIAGNOSIS — E03.9 HYPOTHYROIDISM, UNSPECIFIED TYPE: Primary | ICD-10-CM

## 2021-07-19 DIAGNOSIS — L40.9 SCALP PSORIASIS: ICD-10-CM

## 2021-07-19 DIAGNOSIS — J30.9 ALLERGIC RHINITIS, UNSPECIFIED SEASONALITY, UNSPECIFIED TRIGGER: ICD-10-CM

## 2021-07-19 LAB
T4 FREE: 1.6 NG/DL (ref 0.9–1.8)
TSH SERPL DL<=0.05 MIU/L-ACNC: 0.54 UIU/ML (ref 0.27–4.2)

## 2021-07-19 PROCEDURE — 99214 OFFICE O/P EST MOD 30 MIN: CPT | Performed by: FAMILY MEDICINE

## 2021-07-19 RX ORDER — CLOBETASOL PROPIONATE 0.46 MG/ML
SOLUTION TOPICAL
Qty: 50 ML | Refills: 5 | Status: SHIPPED | OUTPATIENT
Start: 2021-07-19 | End: 2022-10-27

## 2021-07-19 ASSESSMENT — PATIENT HEALTH QUESTIONNAIRE - PHQ9
SUM OF ALL RESPONSES TO PHQ QUESTIONS 1-9: 0
SUM OF ALL RESPONSES TO PHQ9 QUESTIONS 1 & 2: 0
SUM OF ALL RESPONSES TO PHQ QUESTIONS 1-9: 0
2. FEELING DOWN, DEPRESSED OR HOPELESS: 0
SUM OF ALL RESPONSES TO PHQ QUESTIONS 1-9: 0
1. LITTLE INTEREST OR PLEASURE IN DOING THINGS: 0

## 2021-07-23 ENCOUNTER — TELEPHONE (OUTPATIENT)
Dept: ADMINISTRATIVE | Age: 61
End: 2021-07-23

## 2021-07-31 NOTE — PROGRESS NOTES
Lei Magallon (:  1960) is a 62 y.o. female,Established patient, here for evaluation of the following chief complaint(s):  Medication Check         ASSESSMENT/PLAN:  1. Hypothyroidism, unspecified type  -     TSH without Reflex  -     T4, FREE  2. Allergic rhinitis, unspecified seasonality, unspecified trigger  Recommended using over-the-counter medications and continuing to monitor with this. 3. Scalp psoriasis  -     clobetasol (TEMOVATE) 0.05 % external solution; Apply topically 2 times daily. , Disp-50 mL, R-5, Normal      No follow-ups on file. Subjective   SUBJECTIVE/OBJECTIVE:  Lei Magallon is a 62 y.o. female. Patient presents with:  Medication Check      29-year-old female with the following issues:    1. Patient has a history of hypothyroidism. She is been taking her medication regularly without significant problem. She notes that her energy level has been normal.  She notes that she has been taking her medicine on an empty stomach first thing in the morning and having no significant problems with this. 2.  Scalp psoriasis. Patient continues to have significant scalp psoriasis. She would like a refill of medication for this. She notes that this has worked well for her in the past.  She has been out of medication for some time. 3.  Seasonal allergic rhinitis. Patient continues to have significant issues with her seasonal allergic rhinitis and having sneezing and stuffy nose. She notes that this is worse when she goes outside. She has been using over-the-counter medications for this. The patients PMH, surgical history, family history, medications, allergies were all reviewed and updated as appropriate today. Review of Systems       Objective   Physical Exam  Vitals and nursing note reviewed. Constitutional:       Appearance: She is well-developed. HENT:      Head: Normocephalic and atraumatic.       Right Ear: External ear normal.      Left Ear: External ear normal.      Nose: Nose normal.   Eyes:      Conjunctiva/sclera: Conjunctivae normal.      Pupils: Pupils are equal, round, and reactive to light. Cardiovascular:      Rate and Rhythm: Normal rate and regular rhythm. Heart sounds: Normal heart sounds. Pulmonary:      Effort: Pulmonary effort is normal.      Breath sounds: Normal breath sounds. Abdominal:      General: Bowel sounds are normal.      Palpations: Abdomen is soft. Musculoskeletal:         General: Normal range of motion. Cervical back: Normal range of motion and neck supple. Skin:     General: Skin is dry. Comments: Scaly silvery plaques noted on scalp   Neurological:      Mental Status: She is alert and oriented to person, place, and time. Deep Tendon Reflexes: Reflexes are normal and symmetric. Psychiatric:         Behavior: Behavior normal.         Thought Content: Thought content normal.         Judgment: Judgment normal.                An electronic signature was used to authenticate this note.     --Jude Medina MD

## 2021-08-03 DIAGNOSIS — E03.9 HYPOTHYROIDISM, UNSPECIFIED TYPE: ICD-10-CM

## 2021-08-03 RX ORDER — LEVOTHYROXINE SODIUM 0.07 MG/1
TABLET ORAL
Qty: 30 TABLET | Refills: 2 | Status: SHIPPED | OUTPATIENT
Start: 2021-08-03 | End: 2021-10-28 | Stop reason: SDUPTHER

## 2021-08-03 NOTE — TELEPHONE ENCOUNTER
Patient is requesting a refill on    Levothyroxine 75 mcg    Send to [address] [facility] Visit  -  7.19.21 Juin Blake  Next Office Visit  -

## 2021-09-28 ENCOUNTER — HOSPITAL ENCOUNTER (OUTPATIENT)
Dept: MAMMOGRAPHY | Age: 61
Discharge: HOME | End: 2021-09-28
Payer: COMMERCIAL

## 2021-09-28 ENCOUNTER — TELEPHONE (OUTPATIENT)
Dept: MAMMOGRAPHY | Age: 61
End: 2021-09-28

## 2021-09-28 DIAGNOSIS — Z12.39 SCREENING BREAST EXAMINATION: ICD-10-CM

## 2021-09-28 PROCEDURE — 77063 BREAST TOMOSYNTHESIS BI: CPT

## 2021-10-28 DIAGNOSIS — E03.9 HYPOTHYROIDISM, UNSPECIFIED TYPE: ICD-10-CM

## 2021-10-28 RX ORDER — LEVOTHYROXINE SODIUM 0.07 MG/1
TABLET ORAL
Qty: 90 TABLET | Refills: 3 | Status: SHIPPED | OUTPATIENT
Start: 2021-10-28 | End: 2022-10-17 | Stop reason: SDUPTHER

## 2021-10-28 NOTE — TELEPHONE ENCOUNTER
Osmel Ends [phone] (home)    is requesting refill(s) of medication Synthroid to preferred pharmacy [facility]

## 2021-11-16 ENCOUNTER — VIRTUAL VISIT (OUTPATIENT)
Dept: FAMILY MEDICINE CLINIC | Age: 61
End: 2021-11-16
Payer: COMMERCIAL

## 2021-11-16 DIAGNOSIS — B96.89 ACUTE BACTERIAL SINUSITIS: Primary | ICD-10-CM

## 2021-11-16 DIAGNOSIS — J01.90 ACUTE BACTERIAL SINUSITIS: Primary | ICD-10-CM

## 2021-11-16 PROCEDURE — 99442 PR PHYS/QHP TELEPHONE EVALUATION 11-20 MIN: CPT | Performed by: NURSE PRACTITIONER

## 2021-11-16 RX ORDER — AMOXICILLIN AND CLAVULANATE POTASSIUM 875; 125 MG/1; MG/1
1 TABLET, FILM COATED ORAL 2 TIMES DAILY
Qty: 20 TABLET | Refills: 0 | Status: SHIPPED | OUTPATIENT
Start: 2021-11-16 | End: 2021-11-26

## 2021-11-16 NOTE — PROGRESS NOTES
Jeovany Downs is a 62 y.o. female evaluated via telephone on 11/16/2021. Consent:  She and/or health care decision maker is aware that that she may receive a bill for this telephone service, depending on her insurance coverage, and has provided verbal consent to proceed: Yes      Documentation:  I communicated with the patient and/or health care decision maker about   Congestion, yellow mucous- coughing up yellow mucous-right ear feels clogged, sinus pain/pressure- headache from sinus pressure, been going on for about 5 days. No loss of taste of smell. No sore throat- did at first- had been having post nasal drainage. No SOB  Prefers to get COVID test from [facility] and test at home     Details of this discussion including any medical advice provided:     1. Acute bacterial sinusitis    - amoxicillin-clavulanate (AUGMENTIN) 875-125 MG per tablet; Take 1 tablet by mouth 2 times daily for 10 days  Dispense: 20 tablet; Refill: 0      I affirm this is a Patient Initiated Episode with a Patient who has not had a related appointment within my department in the past 7 days or scheduled within the next 24 hours. Patient identification was verified at the start of the visit: Yes    Total Time: minutes: 11-20 minutes    The visit was conducted pursuant to the emergency declaration under the [facility], [address] [place] [address] authority and the [employer] and [employer] General Act. Patient identification was verified, and a caregiver was present when appropriate. The patient was located in a state where the provider was credentialed to provide care.     Note: not billable if this call serves to triage the patient into an appointment for the relevant concern      MICHEL Kim - CNP

## 2022-06-16 ENCOUNTER — TELEPHONE (OUTPATIENT)
Dept: FAMILY MEDICINE CLINIC | Age: 62
End: 2022-06-16

## 2022-06-16 NOTE — TELEPHONE ENCOUNTER
Patient will be signing Medical Record Release form so that [address] can have access to her Medical Records. 6/16/22- Emailed Record Release form to patient. [email or web address]

## 2022-10-17 ENCOUNTER — TELEPHONE (OUTPATIENT)
Dept: FAMILY MEDICINE CLINIC | Age: 62
End: 2022-10-17

## 2022-10-17 DIAGNOSIS — E03.9 HYPOTHYROIDISM, UNSPECIFIED TYPE: ICD-10-CM

## 2022-10-17 RX ORDER — LEVOTHYROXINE SODIUM 0.07 MG/1
TABLET ORAL
Qty: 90 TABLET | Refills: 3 | Status: SHIPPED | OUTPATIENT
Start: 2022-10-17

## 2022-10-17 NOTE — TELEPHONE ENCOUNTER
----- Message from Juanitaboris Eric sent at 10/17/2022  9:37 AM EDT -----  Subject: Appointment Request    Reason for Call: Established Patient Appointment needed: Flu Shot    QUESTIONS    Reason for appointment request? Other - flu shot     Additional Information for Provider? PATIENT WOULD LIKE FLU SHOT AT   10/27/22 APPT IF POSSIBLE   ---------------------------------------------------------------------------  --------------  Lorenza Conn INFO  [phone];  Do not leave any message, patient will call back for answer,   OK to respond with electronic message via [employer] portal (only for   patients who have registered [employer] account)  ---------------------------------------------------------------------------  --------------  SCRIPT ANSWERS  TERRI Screen: Kt Blackmon

## 2022-10-17 NOTE — TELEPHONE ENCOUNTER
Pt requesting Refill  levothyroxine (SYNTHROID) 75 MCG tablet     Last Office Visit  -  10/27/22  Next Office Visit  -  7/19/21

## 2022-10-27 ENCOUNTER — OFFICE VISIT (OUTPATIENT)
Dept: FAMILY MEDICINE CLINIC | Age: 62
End: 2022-10-27
Payer: COMMERCIAL

## 2022-10-27 VITALS
OXYGEN SATURATION: 98 % | HEART RATE: 66 BPM | DIASTOLIC BLOOD PRESSURE: 70 MMHG | BODY MASS INDEX: 22.68 KG/M2 | WEIGHT: 128 LBS | HEIGHT: 63 IN | SYSTOLIC BLOOD PRESSURE: 122 MMHG

## 2022-10-27 DIAGNOSIS — Z13.220 SCREENING, LIPID: ICD-10-CM

## 2022-10-27 DIAGNOSIS — E03.9 HYPOTHYROIDISM, UNSPECIFIED TYPE: ICD-10-CM

## 2022-10-27 DIAGNOSIS — Z23 NEED FOR PROPHYLACTIC VACCINATION AND INOCULATION AGAINST INFLUENZA: Primary | ICD-10-CM

## 2022-10-27 DIAGNOSIS — Z00.00 WELL ADULT EXAM: ICD-10-CM

## 2022-10-27 DIAGNOSIS — Z13.1 SCREENING FOR DIABETES MELLITUS: ICD-10-CM

## 2022-10-27 DIAGNOSIS — Z23 NEED FOR SHINGLES VACCINE: ICD-10-CM

## 2022-10-27 LAB
A/G RATIO: 2 (ref 1.1–2.2)
ALBUMIN SERPL-MCNC: 4.7 G/DL (ref 3.4–5)
ALP BLD-CCNC: 63 U/L (ref 40–129)
ALT SERPL-CCNC: 18 U/L (ref 10–40)
ANION GAP SERPL CALCULATED.3IONS-SCNC: 13 MMOL/L (ref 3–16)
AST SERPL-CCNC: 17 U/L (ref 15–37)
BILIRUB SERPL-MCNC: 0.9 MG/DL (ref 0–1)
BUN BLDV-MCNC: 15 MG/DL (ref 7–20)
CALCIUM SERPL-MCNC: 9.9 MG/DL (ref 8.3–10.6)
CHLORIDE BLD-SCNC: 106 MMOL/L (ref 99–110)
CHOLESTEROL, FASTING: 215 MG/DL (ref 0–199)
CO2: 26 MMOL/L (ref 21–32)
CREAT SERPL-MCNC: 0.6 MG/DL (ref 0.6–1.2)
GFR SERPL CREATININE-BSD FRML MDRD: >60 ML/MIN/{1.73_M2}
GLUCOSE BLD-MCNC: 89 MG/DL (ref 70–99)
HDLC SERPL-MCNC: 54 MG/DL (ref 40–60)
LDL CHOLESTEROL CALCULATED: 151 MG/DL
POTASSIUM SERPL-SCNC: 5 MMOL/L (ref 3.5–5.1)
SODIUM BLD-SCNC: 145 MMOL/L (ref 136–145)
T4 FREE: 1.7 NG/DL (ref 0.9–1.8)
TOTAL PROTEIN: 7 G/DL (ref 6.4–8.2)
TRIGLYCERIDE, FASTING: 50 MG/DL (ref 0–150)
TSH SERPL DL<=0.05 MIU/L-ACNC: 0.69 UIU/ML (ref 0.27–4.2)
VLDLC SERPL CALC-MCNC: 10 MG/DL

## 2022-10-27 PROCEDURE — 90471 IMMUNIZATION ADMIN: CPT | Performed by: FAMILY MEDICINE

## 2022-10-27 PROCEDURE — 90472 IMMUNIZATION ADMIN EACH ADD: CPT | Performed by: FAMILY MEDICINE

## 2022-10-27 PROCEDURE — 90750 HZV VACC RECOMBINANT IM: CPT | Performed by: FAMILY MEDICINE

## 2022-10-27 PROCEDURE — 90674 CCIIV4 VAC NO PRSV 0.5 ML IM: CPT | Performed by: FAMILY MEDICINE

## 2022-10-27 PROCEDURE — 99396 PREV VISIT EST AGE 40-64: CPT | Performed by: FAMILY MEDICINE

## 2022-10-27 RX ORDER — BETAMETHASONE DIPROPIONATE 0.05 %
OINTMENT (GRAM) TOPICAL
Qty: 45 G | Refills: 0 | Status: SHIPPED | OUTPATIENT
Start: 2022-10-27 | End: 2022-11-29

## 2022-10-27 ASSESSMENT — PATIENT HEALTH QUESTIONNAIRE - PHQ9
SUM OF ALL RESPONSES TO PHQ QUESTIONS 1-9: 0
SUM OF ALL RESPONSES TO PHQ QUESTIONS 1-9: 0
2. FEELING DOWN, DEPRESSED OR HOPELESS: 0
1. LITTLE INTEREST OR PLEASURE IN DOING THINGS: 0
SUM OF ALL RESPONSES TO PHQ QUESTIONS 1-9: 0
SUM OF ALL RESPONSES TO PHQ QUESTIONS 1-9: 0
SUM OF ALL RESPONSES TO PHQ9 QUESTIONS 1 & 2: 0

## 2022-10-28 LAB
ESTIMATED AVERAGE GLUCOSE: 99.7 MG/DL
HBA1C MFR BLD: 5.1 %

## 2022-11-06 NOTE — PROGRESS NOTES
Catherine Hayes (:  1960) is a 62 y.o. female,Established patient, here for evaluation of the following chief complaint(s):  Medication Check and Hypothyroidism         ASSESSMENT/PLAN:  1. Need for prophylactic vaccination and inoculation against influenza  -     Influenza, FLUCELVAX, (age 10 mo+), IM, Preservative Free, 0.5 mL  2. Need for shingles vaccine  -     Zoster, SHINGRIX, (18 yrs +), IM  3. Screening for diabetes mellitus  -     Hemoglobin A1C; Future  4. Hypothyroidism, unspecified type  -     TSH; Future  -     T4, Free; Future  5. Well adult exam  -     Comprehensive Metabolic Panel; Future  6. Screening, lipid  -     Lipid, Fasting; Future      No follow-ups on file. Subjective   SUBJECTIVE/OBJECTIVE:  Catherine Hayes is a 62 y.o. female. Patient presents with:  Medication Check  Hypothyroidism      Patient presents for annual exam.  She has a history history of hypothyroidism. She has been taking her medication regularly. She denies any hot or cold intolerance. The patients PMH, surgical history, family history, medications, allergies were all reviewed and updated as appropriate today. Review of Systems       Objective   Physical Exam  Vitals and nursing note reviewed. Constitutional:       Appearance: Normal appearance. She is well-developed. HENT:      Head: Normocephalic and atraumatic. Right Ear: External ear normal.      Left Ear: External ear normal.      Nose: Nose normal.   Eyes:      Conjunctiva/sclera: Conjunctivae normal.      Pupils: Pupils are equal, round, and reactive to light. Cardiovascular:      Rate and Rhythm: Normal rate and regular rhythm. Heart sounds: Normal heart sounds. Pulmonary:      Effort: Pulmonary effort is normal.      Breath sounds: Normal breath sounds. Abdominal:      General: Bowel sounds are normal.      Palpations: Abdomen is soft. Musculoskeletal:         General: Normal range of motion.       Cervical back: Normal range of motion and neck supple. Skin:     General: Skin is dry. Neurological:      Mental Status: She is alert and oriented to person, place, and time. Deep Tendon Reflexes: Reflexes are normal and symmetric. Psychiatric:         Behavior: Behavior normal.         Thought Content: Thought content normal.         Judgment: Judgment normal.            An electronic signature was used to authenticate this note.     --Kaushal Pretty MD

## 2022-11-08 DIAGNOSIS — E03.9 HYPOTHYROIDISM, UNSPECIFIED TYPE: ICD-10-CM

## 2022-11-29 RX ORDER — BETAMETHASONE DIPROPIONATE 0.05 %
OINTMENT (GRAM) TOPICAL
Qty: 45 G | Refills: 0 | Status: SHIPPED | OUTPATIENT
Start: 2022-11-29

## 2023-01-25 ENCOUNTER — NURSE ONLY (OUTPATIENT)
Dept: FAMILY MEDICINE CLINIC | Age: 63
End: 2023-01-25
Payer: COMMERCIAL

## 2023-01-25 DIAGNOSIS — Z23 NEED FOR SHINGLES VACCINE: ICD-10-CM

## 2023-01-25 DIAGNOSIS — Z23 NEED FOR TDAP VACCINATION: Primary | ICD-10-CM

## 2023-01-25 PROCEDURE — 90750 HZV VACC RECOMBINANT IM: CPT | Performed by: FAMILY MEDICINE

## 2023-01-25 PROCEDURE — 90471 IMMUNIZATION ADMIN: CPT | Performed by: FAMILY MEDICINE

## 2023-01-25 PROCEDURE — 90472 IMMUNIZATION ADMIN EACH ADD: CPT | Performed by: FAMILY MEDICINE

## 2023-01-25 PROCEDURE — 90715 TDAP VACCINE 7 YRS/> IM: CPT | Performed by: FAMILY MEDICINE

## 2023-05-19 ENCOUNTER — TELEPHONE (OUTPATIENT)
Dept: FAMILY MEDICINE CLINIC | Age: 63
End: 2023-05-19

## 2023-05-19 ENCOUNTER — OFFICE VISIT (OUTPATIENT)
Dept: FAMILY MEDICINE CLINIC | Age: 63
End: 2023-05-19
Payer: COMMERCIAL

## 2023-05-19 VITALS
DIASTOLIC BLOOD PRESSURE: 68 MMHG | OXYGEN SATURATION: 99 % | WEIGHT: 132 LBS | HEART RATE: 64 BPM | SYSTOLIC BLOOD PRESSURE: 110 MMHG | HEIGHT: 63 IN | BODY MASS INDEX: 23.39 KG/M2

## 2023-05-19 DIAGNOSIS — Z82.49 FAMILY HISTORY OF HEART DISEASE IN MALE FAMILY MEMBER BEFORE AGE 55: ICD-10-CM

## 2023-05-19 DIAGNOSIS — R07.9 CHEST PAIN, UNSPECIFIED TYPE: ICD-10-CM

## 2023-05-19 DIAGNOSIS — R07.89 OTHER CHEST PAIN: Primary | ICD-10-CM

## 2023-05-19 PROCEDURE — 99213 OFFICE O/P EST LOW 20 MIN: CPT | Performed by: STUDENT IN AN ORGANIZED HEALTH CARE EDUCATION/TRAINING PROGRAM

## 2023-05-19 PROCEDURE — 93000 ELECTROCARDIOGRAM COMPLETE: CPT | Performed by: STUDENT IN AN ORGANIZED HEALTH CARE EDUCATION/TRAINING PROGRAM

## 2023-05-19 ASSESSMENT — PATIENT HEALTH QUESTIONNAIRE - PHQ9
SUM OF ALL RESPONSES TO PHQ QUESTIONS 1-9: 0
SUM OF ALL RESPONSES TO PHQ QUESTIONS 1-9: 0
SUM OF ALL RESPONSES TO PHQ9 QUESTIONS 1 & 2: 0
SUM OF ALL RESPONSES TO PHQ QUESTIONS 1-9: 0
2. FEELING DOWN, DEPRESSED OR HOPELESS: 0
SUM OF ALL RESPONSES TO PHQ QUESTIONS 1-9: 0
1. LITTLE INTEREST OR PLEASURE IN DOING THINGS: 0

## 2023-05-19 NOTE — PROGRESS NOTES
Richard Schultz (:  1960) is a 65 y.o. female,Established patient, here for evaluation of the following chief complaint(s):  Chest Pain (LEFT SIDE CHEST DISCOMFORT X2 MONTHS)         ASSESSMENT/PLAN:  1. Other chest pain  -     EKG 12 Lead - Clinic Performed  2. Family history of heart disease in male family member before age 54  -     Ricky Singh MD, Cardiology, [facility]  3. Chest pain, unspecified type  -     Ricky Singh MD, Cardiology, [facility]  EKG showed normal sinus rhythm in office today. Given very strong family history of heart disease and relatively young family members, including those passing away before age 54, will refer to cardiologist for possible stress test at this time. No follow-ups on file. Subjective   SUBJECTIVE/OBJECTIVE:  HPI  Patient is a 45-year-old female, who presents with left-sided chest pain. She reports that she had been doing a lot of yard work and had had some chest pain while performing the physical activity. Initially she felt like she may have pulled a muscle, but reports that it has not gotten better over the past 2 months. She reports that it is intermittently present and nothing seems to exacerbate the pain. It does not get worse with palpation. She denies fever, nausea, vomiting, shortness of breath, heartburn, abdominal pain, overlying skin changes, bruising, or diaphoresis. Patient is concerned because the pain does not necessarily get worse when doing the same motions that she was doing when she originally felt like she pulled a muscle. Patient also has a strong family history of cardiac problems including a family member who passed away before age 61. Patient states that he had cholesterol problems, blockages and procedures. On his heart before he passed away. Patient would like further evaluation with a cardiologist at this time. Review of Systems   All other systems reviewed and are negative.

## 2023-07-17 DIAGNOSIS — E03.9 HYPOTHYROIDISM, UNSPECIFIED TYPE: ICD-10-CM

## 2023-07-17 RX ORDER — LEVOTHYROXINE SODIUM 0.07 MG/1
TABLET ORAL
Qty: 90 TABLET | Refills: 3 | Status: SHIPPED | OUTPATIENT
Start: 2023-07-17

## 2023-07-18 ENCOUNTER — OFFICE VISIT (OUTPATIENT)
Dept: CARDIOLOGY CLINIC | Age: 63
End: 2023-07-18
Payer: COMMERCIAL

## 2023-07-18 ENCOUNTER — TELEPHONE (OUTPATIENT)
Dept: CARDIOLOGY CLINIC | Age: 63
End: 2023-07-18

## 2023-07-18 VITALS
DIASTOLIC BLOOD PRESSURE: 68 MMHG | BODY MASS INDEX: 22.55 KG/M2 | OXYGEN SATURATION: 98 % | RESPIRATION RATE: 16 BRPM | WEIGHT: 127.25 LBS | HEART RATE: 74 BPM | SYSTOLIC BLOOD PRESSURE: 106 MMHG | HEIGHT: 63 IN

## 2023-07-18 DIAGNOSIS — R06.02 SOB (SHORTNESS OF BREATH): ICD-10-CM

## 2023-07-18 DIAGNOSIS — R07.9 CHEST PAIN, UNSPECIFIED TYPE: ICD-10-CM

## 2023-07-18 DIAGNOSIS — E78.00 ELEVATED CHOLESTEROL: ICD-10-CM

## 2023-07-18 DIAGNOSIS — Z82.49 FAMILY HISTORY OF HEART DISEASE: ICD-10-CM

## 2023-07-18 PROCEDURE — 99244 OFF/OP CNSLTJ NEW/EST MOD 40: CPT | Performed by: INTERNAL MEDICINE

## 2023-07-18 NOTE — PROGRESS NOTES
[address]   Cardiovascular Evaluation    PATIENT: Jason Garcia  DATE: 2023  MRN: [id number]  CSN: [id number]  : 1960    Primary Care Doctor/Referring provider: Salas Dave MD, No admitting provider for patient encounter. Reason for evaluation/Chief complaint:   New Patient      Subjective:    History of present illness on initial date of evaluation:   Jason Garcia is a 65 y.o. female patient who presents as a new patient for cardiology evaluation and treatment for chest pain. She was referred by her primary care provider office Monalisa Flores MD. She has a past medical history including hypothyroidism and family history of heart disease. EKG performed 23 SR. Today she states she she saw her PCP and her symptoms have dissipated. She was experiencing chest tightness for 8 weeks. She reports she though initially was muscle related from working in the yard. She states one night she became concerned due to SOB while lying down. She had never experienced the SOB before. This prompted her to seek PCP. She has not had CP or SOB since PCP office visit. Patient currently denies any weight gain, edema, palpitations, dizziness, and syncope. Father   at 47, CAD with multiple heart attacks. Patient Active Problem List   Diagnosis    Hypothyroidism    Stress incontinence    Eustachian tube dysfunction    Low back pain    Chest pain    SOB (shortness of breath)    Family history of heart disease    Elevated cholesterol         Cardiac Testing: I have reviewed the findings below. EKG:  ECHO:   STRESS TEST:  CATH:  BYPASS:  VASCULAR:    Past Medical History:   has a past medical history of Allergic rhinitis, Hypothyroidism, Sciatica, and Varicose vein. Surgical History:   has a past surgical history that includes cervical fusion (). Social History:   reports that she has never smoked.  She has never used smokeless tobacco. She reports that

## 2023-07-18 NOTE — PATIENT INSTRUCTIONS
Your provider has ordered testing for further evaluation. An order/prescription has been included in your paper work. To schedule outpatient testing, contact Central Scheduling by calling [facility] ([phone]). Plan:  1. Order echocardiogram ~ chest pain/ shortness of breath  ~A test that records movement of your heart valves and chambers by ultrasound. Evaluates heart valves, any chamber enlargement, abnormal openings, or any fluid in the sac surrounding the heart. 2. Order GXT stress myoview ~ chest pain/ shortness of breath  ~A small IV will be placed into your arm to administer a radioisotope (myoview) to visualize your heart. . You will then waiting period to allow the tracer to be absorbed by the heart muscle. After the waiting period, we will take pictures of the blood flow to your heart muscle with the nuclear camera. Following your pictures, we will perform your stress test. We can do your stress test by having you walk on the treadmill or by giving you a medication (Mark Ramus) which makes your body think it is exercising. We will monitor you before, during, and after your stress test to make sure you are safe and comfortable. 3. Order CT Calcium score ~ family history of heart disease  ~ A specialized X-ray test that provides images of your heart that can help your doctor determine a score based on the detection and measurement of calcium-containing plaque in the arteries. 4. Order vascular screening ~ assess for calcification abdominal aorta and carotid arteries. Assess bloodflow to lower extremities. 5. Reviewed cholesterol panel ~ will continue to monitor discussed potential medication management based on cardiac testing results. 6. Follow up based on testing results.

## 2023-07-18 NOTE — TELEPHONE ENCOUNTER
SCREENING QUESTIONS:       Do you have a history of cardiovascular disease, this includes any of the following- heart stent and/or open-heart surgery, stroke or abdominal aortic aneurysm? No (If the answer is yes please do not schedule)     Are you currently following up with a cardiologist? Yes     If no, would you like our office to contact you? No        Do you have a family history of abdominal aortic aneurysm, heart attack or stroke? Yes    Do you have high cholesterol? Yes    Do you have high blood pressure? No    Do you have diabetes? No    Do you currently smoke or are you a former smoker?  No      WRAP UP:     PLEASE CHECK EACH ONE     Referred by: Juliana Joe    [x] Scheduled on 08/10/23 at 9:00 AM at the [facility] office    [x] Instructions given to patient- Nothing to eat or drink 8 hrs prior to appointment and wear loose, comfortable clothing    [] Not scheduled due to previous history themselves    [] Sent to RN pool, has a cardiac history and is not following a cardiologist, would like a follow call

## 2023-08-10 ENCOUNTER — PROCEDURE VISIT (OUTPATIENT)
Dept: CARDIOLOGY CLINIC | Age: 63
End: 2023-08-10

## 2023-08-10 ENCOUNTER — TELEPHONE (OUTPATIENT)
Dept: CARDIOLOGY CLINIC | Age: 63
End: 2023-08-10

## 2023-08-10 DIAGNOSIS — Z13.6 ENCOUNTER FOR SCREENING FOR CARDIOVASCULAR DISORDERS: Primary | ICD-10-CM

## 2023-08-10 PROCEDURE — MISCABI SCREENING ABI: Performed by: INTERNAL MEDICINE

## 2023-08-10 NOTE — TELEPHONE ENCOUNTER
Attempted to reach patient. No answer. Left detailed VM with below information and [employer] number for results to be forwarded for VSP review per request of patient. [facility] information cardiac calcium score [place] [facility] at [address] [place] [address] to call [phone].

## 2023-08-14 DIAGNOSIS — Z79.899 MEDICATION MANAGEMENT: ICD-10-CM

## 2023-08-14 DIAGNOSIS — I70.90 ATHEROSCLEROSIS: Primary | ICD-10-CM

## 2023-08-14 RX ORDER — ASPIRIN 81 MG/1
81 TABLET, CHEWABLE ORAL DAILY
Qty: 30 TABLET | Refills: 3
Start: 2023-08-14

## 2023-08-14 RX ORDER — ATORVASTATIN CALCIUM 20 MG/1
20 TABLET, FILM COATED ORAL NIGHTLY
Qty: 30 TABLET | Refills: 5 | Status: SHIPPED | OUTPATIENT
Start: 2023-08-14

## 2023-08-14 NOTE — TELEPHONE ENCOUNTER
----- Message from Darren Alexander MD sent at 8/11/2023  1:03 PM EDT -----  Please let the patient know that the vascular screening exam shows mild atherosclerosis. We would like for them to be on asa 81mg and statin therapy with lipitor 20mg or crestor 10mg.     Check labs in 12 weeks
Called and spoke with patient. Relayed VSP results and instructions. Lipid/LFT 3 months ordered. Patient is agreeable, VU.
[de-identified] : Procedure Note: Durolane Injection\par \par Anatomic Location: Right Knee\par \par Diagnosis: Arthritis\par \par Procedure: Injection of 60mg/3mL Durolane\par \par Lot Number: [id number]    Expiration Date: 09-\par \par Local Spray: Ethyl Chloride.\par \par Patient has consented for the procedure.\par \par Injection through a lateral parapatella approach.\par \par Patient tolerated the procedure well.\par \par Patient instructed to call the office if any reaction, fever, chills, increased erythema or swelling. [phone]. \par \par \par \par Procedure Note: Durolane Injection\par \par Anatomic Location: Left Knee\par \par Diagnosis: Arthritis\par \par Procedure: Injection of 60mg/3mL Durolane\par \par Lot Number: [id number]    Expiration Date: 09-\par \par Local Spray: Ethyl Chloride.\par \par Patient has consented for the procedure.\par \par Injection through a lateral parapatella approach.\par \par Patient tolerated the procedure well.\par \par Patient instructed to call the office if any reaction, fever, chills, increased erythema or swelling. [phone].

## 2023-11-30 ENCOUNTER — HOSPITAL ENCOUNTER (OUTPATIENT)
Age: 63
Discharge: HOME | End: 2023-11-30
Payer: COMMERCIAL

## 2023-11-30 DIAGNOSIS — Z79.899 MEDICATION MANAGEMENT: ICD-10-CM

## 2023-11-30 LAB
ALBUMIN SERPL-MCNC: 4.5 G/DL (ref 3.4–5)
ALP SERPL-CCNC: 73 U/L (ref 40–129)
ALT SERPL-CCNC: 27 U/L (ref 10–40)
AST SERPL-CCNC: 21 U/L (ref 15–37)
BILIRUB DIRECT SERPL-MCNC: <0.2 MG/DL (ref 0–0.3)
BILIRUB INDIRECT SERPL-MCNC: NORMAL MG/DL (ref 0–1)
BILIRUB SERPL-MCNC: 1 MG/DL (ref 0–1)
CHOLEST SERPL-MCNC: 118 MG/DL (ref 0–199)
HDLC SERPL-MCNC: 50 MG/DL (ref 40–60)
LDLC SERPL CALC-MCNC: 59 MG/DL
PROT SERPL-MCNC: 7.3 G/DL (ref 6.4–8.2)
TRIGL SERPL-MCNC: 43 MG/DL (ref 0–150)
VLDLC SERPL CALC-MCNC: 9 MG/DL

## 2023-11-30 PROCEDURE — 80076 HEPATIC FUNCTION PANEL: CPT

## 2023-11-30 PROCEDURE — 36415 COLL VENOUS BLD VENIPUNCTURE: CPT

## 2023-11-30 PROCEDURE — 80061 LIPID PANEL: CPT

## 2023-12-01 ENCOUNTER — TELEPHONE (OUTPATIENT)
Dept: CARDIOLOGY CLINIC | Age: 63
End: 2023-12-01

## 2023-12-01 NOTE — TELEPHONE ENCOUNTER
----- Message from Darren Alexander MD sent at 12/1/2023 12:16 PM EST -----  The patient lipid profile was reviewed and levels are goal.

## 2023-12-30 ENCOUNTER — HOSPITAL ENCOUNTER (OUTPATIENT)
Dept: CT IMAGING | Age: 63
Discharge: HOME | End: 2023-12-30
Attending: INTERNAL MEDICINE
Payer: COMMERCIAL

## 2023-12-30 DIAGNOSIS — Z82.49 FAMILY HISTORY OF HEART DISEASE: ICD-10-CM

## 2023-12-30 DIAGNOSIS — E78.00 ELEVATED CHOLESTEROL: ICD-10-CM

## 2023-12-30 PROCEDURE — 75571 CT HRT W/O DYE W/CA TEST: CPT

## 2024-01-02 DIAGNOSIS — I70.90 ATHEROSCLEROSIS: ICD-10-CM

## 2024-01-02 RX ORDER — ATORVASTATIN CALCIUM 20 MG/1
20 TABLET, FILM COATED ORAL NIGHTLY
Qty: 90 TABLET | Refills: 3 | Status: SHIPPED | OUTPATIENT
Start: 2024-01-02

## 2024-01-02 NOTE — TELEPHONE ENCOUNTER
----- Message from Opal Sidhu MD sent at 1/2/2024 11:36 AM EST -----  The calcium score is not elevated suggesting no early signs of plaque.    Encourage healthy lifestyle.

## 2024-01-30 ENCOUNTER — TELEMEDICINE (OUTPATIENT)
Dept: FAMILY MEDICINE CLINIC | Age: 64
End: 2024-01-30
Payer: COMMERCIAL

## 2024-01-30 DIAGNOSIS — J01.90 ACUTE BACTERIAL SINUSITIS: Primary | ICD-10-CM

## 2024-01-30 DIAGNOSIS — B96.89 ACUTE BACTERIAL SINUSITIS: Primary | ICD-10-CM

## 2024-01-30 PROCEDURE — 99213 OFFICE O/P EST LOW 20 MIN: CPT | Performed by: NURSE PRACTITIONER

## 2024-01-30 RX ORDER — AMOXICILLIN AND CLAVULANATE POTASSIUM 875; 125 MG/1; MG/1
1 TABLET, FILM COATED ORAL 2 TIMES DAILY
Qty: 20 TABLET | Refills: 0 | Status: SHIPPED | OUTPATIENT
Start: 2024-01-30 | End: 2024-02-09

## 2024-01-30 ASSESSMENT — PATIENT HEALTH QUESTIONNAIRE - PHQ9
SUM OF ALL RESPONSES TO PHQ9 QUESTIONS 1 & 2: 0
SUM OF ALL RESPONSES TO PHQ QUESTIONS 1-9: 0
1. LITTLE INTEREST OR PLEASURE IN DOING THINGS: 0
SUM OF ALL RESPONSES TO PHQ QUESTIONS 1-9: 0
2. FEELING DOWN, DEPRESSED OR HOPELESS: 0
SUM OF ALL RESPONSES TO PHQ QUESTIONS 1-9: 0
SUM OF ALL RESPONSES TO PHQ QUESTIONS 1-9: 0

## 2024-01-30 ASSESSMENT — ENCOUNTER SYMPTOMS
SORE THROAT: 1
COUGH: 0

## 2024-01-30 NOTE — PROGRESS NOTES
Yin Munroe (:  1960) is a Established patient, presenting virtually for evaluation of the following:    Assessment & Plan   Below is the assessment and plan developed based on review of pertinent history, physical exam, labs, studies, and medications.  1. Acute bacterial sinusitis  If not improving make appt in office to be seen  -     amoxicillin-clavulanate (AUGMENTIN) 875-125 MG per tablet; Take 1 tablet by mouth 2 times daily for 10 days, Disp-20 tablet, R-0Normal    No follow-ups on file.       Subjective   HPI  Left side facial pain- teeth have been hurting on left side as well as her jaw on the left side, sore throat, low grade temp. Has been going on for about a week- but got worse yesterday. Has been taking ibuprofen and sudafed. Nothing helping     Review of Systems   Constitutional:  Positive for chills and diaphoresis.   HENT:  Positive for sore throat. Negative for congestion.         Left side jaw pain and teeth on left side pain   Respiratory:  Negative for cough.    Neurological:  Headaches: slight.          Objective   Patient-Reported Vitals  No data recorded     Physical Exam  Constitutional:       Appearance: Normal appearance.   Eyes:      Conjunctiva/sclera: Conjunctivae normal.   Pulmonary:      Effort: Pulmonary effort is normal.   Neurological:      Mental Status: She is alert.   Psychiatric:         Mood and Affect: Mood normal.         Behavior: Behavior normal.         Thought Content: Thought content normal.         Judgment: Judgment normal.                  Yin Munroe, was evaluated through a synchronous (real-time) audio-video encounter. The patient (or guardian if applicable) is aware that this is a billable service, which includes applicable co-pays. This Virtual Visit was conducted with patient's (and/or legal guardian's) consent. Patient identification was verified, and a caregiver was present when appropriate.   The patient was located at Home: [address] [place] [address]

## 2024-02-01 ENCOUNTER — TELEPHONE (OUTPATIENT)
Dept: FAMILY MEDICINE CLINIC | Age: 64
End: 2024-02-01

## 2024-02-01 NOTE — TELEPHONE ENCOUNTER
Patient called office to let Dr. Casanova know that she thinks she was misdiagnosed during her VV with Adele (1/30/24). Adele diagnosed her with a sinus infection but pt believes she has mumps. She states that she has stopped taking the antibiotics prescribed to her because she can not eat enough due to the pain.  Patient has just been taking Advil for the pain and states that it is helping.    Pt was offered another visit with Dr. Casanova or another provider and she declined. She states she just wanted to let Dr. Casanova know about the situation.

## 2024-06-28 ENCOUNTER — COMMUNITY OUTREACH (OUTPATIENT)
Dept: FAMILY MEDICINE CLINIC | Age: 64
End: 2024-06-28

## 2024-07-12 DIAGNOSIS — E03.9 HYPOTHYROIDISM, UNSPECIFIED TYPE: ICD-10-CM

## 2024-07-12 RX ORDER — LEVOTHYROXINE SODIUM 0.07 MG/1
TABLET ORAL
Qty: 90 TABLET | Refills: 3 | Status: SHIPPED | OUTPATIENT
Start: 2024-07-12

## 2024-07-12 NOTE — TELEPHONE ENCOUNTER
Last Office Visit  -  10/27/2022  Next Office Visit  -  8/13/2024    Last Filled  -  7/17/2023  Last UDS -    Contract -

## 2024-08-11 SDOH — HEALTH STABILITY: PHYSICAL HEALTH: ON AVERAGE, HOW MANY MINUTES DO YOU ENGAGE IN EXERCISE AT THIS LEVEL?: 0 MIN

## 2024-08-11 SDOH — HEALTH STABILITY: PHYSICAL HEALTH: ON AVERAGE, HOW MANY DAYS PER WEEK DO YOU ENGAGE IN MODERATE TO STRENUOUS EXERCISE (LIKE A BRISK WALK)?: 0 DAYS

## 2024-08-13 ENCOUNTER — OFFICE VISIT (OUTPATIENT)
Dept: FAMILY MEDICINE CLINIC | Age: 64
End: 2024-08-13
Payer: COMMERCIAL

## 2024-08-13 VITALS
WEIGHT: 126.6 LBS | HEART RATE: 71 BPM | OXYGEN SATURATION: 98 % | HEIGHT: 63 IN | BODY MASS INDEX: 22.43 KG/M2 | DIASTOLIC BLOOD PRESSURE: 70 MMHG | TEMPERATURE: 97.8 F | SYSTOLIC BLOOD PRESSURE: 110 MMHG

## 2024-08-13 DIAGNOSIS — E89.0 POSTABLATIVE HYPOTHYROIDISM: ICD-10-CM

## 2024-08-13 DIAGNOSIS — Z11.59 ENCOUNTER FOR HEPATITIS C SCREENING TEST FOR LOW RISK PATIENT: ICD-10-CM

## 2024-08-13 DIAGNOSIS — Z12.11 COLON CANCER SCREENING: ICD-10-CM

## 2024-08-13 DIAGNOSIS — Z11.4 ENCOUNTER FOR SCREENING FOR HIV: ICD-10-CM

## 2024-08-13 DIAGNOSIS — L40.9 SCALP PSORIASIS: ICD-10-CM

## 2024-08-13 DIAGNOSIS — D48.5 NEOPLASM OF UNCERTAIN BEHAVIOR OF SKIN OF CHEST: ICD-10-CM

## 2024-08-13 DIAGNOSIS — Z92.3 S/P RADIOACTIVE IODINE THYROID ABLATION: ICD-10-CM

## 2024-08-13 DIAGNOSIS — Z00.00 ROUTINE GENERAL MEDICAL EXAMINATION AT A HEALTH CARE FACILITY: ICD-10-CM

## 2024-08-13 DIAGNOSIS — E05.00 GRAVES' DISEASE IN REMISSION: ICD-10-CM

## 2024-08-13 DIAGNOSIS — Z12.31 ENCOUNTER FOR SCREENING MAMMOGRAM FOR MALIGNANT NEOPLASM OF BREAST: ICD-10-CM

## 2024-08-13 DIAGNOSIS — Z00.00 ROUTINE GENERAL MEDICAL EXAMINATION AT A HEALTH CARE FACILITY: Primary | ICD-10-CM

## 2024-08-13 PROBLEM — R07.9 CHEST PAIN: Status: RESOLVED | Noted: 2023-07-18 | Resolved: 2024-08-13

## 2024-08-13 PROBLEM — R06.02 SOB (SHORTNESS OF BREATH): Status: RESOLVED | Noted: 2023-07-18 | Resolved: 2024-08-13

## 2024-08-13 PROCEDURE — 99396 PREV VISIT EST AGE 40-64: CPT | Performed by: FAMILY MEDICINE

## 2024-08-13 RX ORDER — ASPIRIN 81 MG/1
81 TABLET ORAL DAILY
COMMUNITY

## 2024-08-13 SDOH — ECONOMIC STABILITY: FOOD INSECURITY: WITHIN THE PAST 12 MONTHS, YOU WORRIED THAT YOUR FOOD WOULD RUN OUT BEFORE YOU GOT MONEY TO BUY MORE.: NEVER TRUE

## 2024-08-13 SDOH — ECONOMIC STABILITY: FOOD INSECURITY: WITHIN THE PAST 12 MONTHS, THE FOOD YOU BOUGHT JUST DIDN'T LAST AND YOU DIDN'T HAVE MONEY TO GET MORE.: NEVER TRUE

## 2024-08-13 SDOH — ECONOMIC STABILITY: INCOME INSECURITY: HOW HARD IS IT FOR YOU TO PAY FOR THE VERY BASICS LIKE FOOD, HOUSING, MEDICAL CARE, AND HEATING?: NOT HARD AT ALL

## 2024-08-13 ASSESSMENT — PATIENT HEALTH QUESTIONNAIRE - PHQ9
3. TROUBLE FALLING OR STAYING ASLEEP: NOT AT ALL
2. FEELING DOWN, DEPRESSED OR HOPELESS: NOT AT ALL
6. FEELING BAD ABOUT YOURSELF - OR THAT YOU ARE A FAILURE OR HAVE LET YOURSELF OR YOUR FAMILY DOWN: NOT AT ALL
7. TROUBLE CONCENTRATING ON THINGS, SUCH AS READING THE NEWSPAPER OR WATCHING TELEVISION: NOT AT ALL
5. POOR APPETITE OR OVEREATING: NOT AT ALL
10. IF YOU CHECKED OFF ANY PROBLEMS, HOW DIFFICULT HAVE THESE PROBLEMS MADE IT FOR YOU TO DO YOUR WORK, TAKE CARE OF THINGS AT HOME, OR GET ALONG WITH OTHER PEOPLE: NOT DIFFICULT AT ALL
4. FEELING TIRED OR HAVING LITTLE ENERGY: NOT AT ALL
SUM OF ALL RESPONSES TO PHQ9 QUESTIONS 1 & 2: 0
SUM OF ALL RESPONSES TO PHQ QUESTIONS 1-9: 0
1. LITTLE INTEREST OR PLEASURE IN DOING THINGS: NOT AT ALL
9. THOUGHTS THAT YOU WOULD BE BETTER OFF DEAD, OR OF HURTING YOURSELF: NOT AT ALL
8. MOVING OR SPEAKING SO SLOWLY THAT OTHER PEOPLE COULD HAVE NOTICED. OR THE OPPOSITE, BEING SO FIGETY OR RESTLESS THAT YOU HAVE BEEN MOVING AROUND A LOT MORE THAN USUAL: NOT AT ALL
SUM OF ALL RESPONSES TO PHQ QUESTIONS 1-9: 0

## 2024-08-13 ASSESSMENT — ANXIETY QUESTIONNAIRES
2. NOT BEING ABLE TO STOP OR CONTROL WORRYING: NOT AT ALL
7. FEELING AFRAID AS IF SOMETHING AWFUL MIGHT HAPPEN: NOT AT ALL
3. WORRYING TOO MUCH ABOUT DIFFERENT THINGS: NOT AT ALL
IF YOU CHECKED OFF ANY PROBLEMS ON THIS QUESTIONNAIRE, HOW DIFFICULT HAVE THESE PROBLEMS MADE IT FOR YOU TO DO YOUR WORK, TAKE CARE OF THINGS AT HOME, OR GET ALONG WITH OTHER PEOPLE: NOT DIFFICULT AT ALL
6. BECOMING EASILY ANNOYED OR IRRITABLE: NOT AT ALL
GAD7 TOTAL SCORE: 0
4. TROUBLE RELAXING: NOT AT ALL
1. FEELING NERVOUS, ANXIOUS, OR ON EDGE: NOT AT ALL
5. BEING SO RESTLESS THAT IT IS HARD TO SIT STILL: NOT AT ALL

## 2024-08-14 LAB
ALBUMIN SERPL-MCNC: 4.6 G/DL (ref 3.4–5)
ALBUMIN/GLOB SERPL: 2 {RATIO} (ref 1.1–2.2)
ALP SERPL-CCNC: 85 U/L (ref 40–129)
ALT SERPL-CCNC: 30 U/L (ref 10–40)
ANION GAP SERPL CALCULATED.3IONS-SCNC: 10 MMOL/L (ref 3–16)
AST SERPL-CCNC: 24 U/L (ref 15–37)
BASOPHILS # BLD: 0.1 K/UL (ref 0–0.2)
BASOPHILS NFR BLD: 1.3 %
BILIRUB SERPL-MCNC: 1.2 MG/DL (ref 0–1)
BUN SERPL-MCNC: 14 MG/DL (ref 7–20)
CALCIUM SERPL-MCNC: 9.9 MG/DL (ref 8.3–10.6)
CHLORIDE SERPL-SCNC: 103 MMOL/L (ref 99–110)
CHOLEST SERPL-MCNC: 129 MG/DL (ref 0–199)
CO2 SERPL-SCNC: 28 MMOL/L (ref 21–32)
CREAT SERPL-MCNC: 0.7 MG/DL (ref 0.6–1.2)
DEPRECATED RDW RBC AUTO: 14.6 % (ref 12.4–15.4)
EOSINOPHIL # BLD: 0.2 K/UL (ref 0–0.6)
EOSINOPHIL NFR BLD: 2.8 %
EST. AVERAGE GLUCOSE BLD GHB EST-MCNC: 108.3 MG/DL
GFR SERPLBLD CREATININE-BSD FMLA CKD-EPI: >90 ML/MIN/{1.73_M2}
GLUCOSE SERPL-MCNC: 72 MG/DL (ref 70–99)
HBA1C MFR BLD: 5.4 %
HCT VFR BLD AUTO: 40.7 % (ref 36–48)
HDLC SERPL-MCNC: 49 MG/DL (ref 40–60)
HGB BLD-MCNC: 13.6 G/DL (ref 12–16)
HIV 1+2 AB+HIV1 P24 AG SERPL QL IA: NORMAL
HIV 2 AB SERPL QL IA: NORMAL
HIV1 AB SERPL QL IA: NORMAL
HIV1 P24 AG SERPL QL IA: NORMAL
LDLC SERPL CALC-MCNC: 69 MG/DL
LYMPHOCYTES # BLD: 1.9 K/UL (ref 1–5.1)
LYMPHOCYTES NFR BLD: 28.9 %
MCH RBC QN AUTO: 31.2 PG (ref 26–34)
MCHC RBC AUTO-ENTMCNC: 33.5 G/DL (ref 31–36)
MCV RBC AUTO: 93.2 FL (ref 80–100)
MONOCYTES # BLD: 0.5 K/UL (ref 0–1.3)
MONOCYTES NFR BLD: 7.2 %
NEUTROPHILS # BLD: 3.9 K/UL (ref 1.7–7.7)
NEUTROPHILS NFR BLD: 59.8 %
PLATELET # BLD AUTO: 202 K/UL (ref 135–450)
PMV BLD AUTO: 10.2 FL (ref 5–10.5)
POTASSIUM SERPL-SCNC: 4.6 MMOL/L (ref 3.5–5.1)
PROT SERPL-MCNC: 6.9 G/DL (ref 6.4–8.2)
RBC # BLD AUTO: 4.36 M/UL (ref 4–5.2)
SODIUM SERPL-SCNC: 141 MMOL/L (ref 136–145)
TRIGL SERPL-MCNC: 55 MG/DL (ref 0–150)
TSH SERPL DL<=0.005 MIU/L-ACNC: 0.5 UIU/ML (ref 0.27–4.2)
VLDLC SERPL CALC-MCNC: 11 MG/DL
WBC # BLD AUTO: 6.5 K/UL (ref 4–11)

## 2024-08-15 LAB — HCV AB SERPL QL IA: NORMAL

## 2024-08-20 PROBLEM — E05.00 GRAVES' DISEASE IN REMISSION: Status: ACTIVE | Noted: 2024-08-20

## 2024-08-20 PROBLEM — Z92.3 S/P RADIOACTIVE IODINE THYROID ABLATION: Status: ACTIVE | Noted: 2024-08-20

## 2024-08-20 ASSESSMENT — ENCOUNTER SYMPTOMS
GASTROINTESTINAL NEGATIVE: 1
EYES NEGATIVE: 1
COLOR CHANGE: 1
RESPIRATORY NEGATIVE: 1

## 2024-08-20 NOTE — PROGRESS NOTES
Well Adult Note  Name: Yin Munroe Today’s Date: 2024   MRN: [id number] Sex: Female   Age: 63 y.o. Ethnicity: Non- / Non    : 1960 Race: White (non-)      Yin Munroe is here for a well adult exam.    Chief Complaint   Patient presents with    New Patient     Est care     Mole     On chest , changing color     Hair/Scalp Problem     Rash on scalp at back of hairline    Hypothyroidism     On Synthroid            Subjective    History:  Yin Munroe is a 63 y.o. female with hypothyroidism from radioactive iodine ablation for Grave's disease who is here today to establish care. Previously a patient of [facility].     She reports having possible scalp psoriasis. She has tried a number of prescription treatments, including clobetasol, betamethasone, desonide, and triamcinolone. None have helped. Rash is primarily located at the base of the hairline on the neck. She is interested in systemic psoriasis treatment if possible.   Thyroid has been well controlled for years. Has been on generic levothyroxine 75 mcg daily for a long time, has not needed dose adjustments. Last TSH was in  which was normal.   She reports a mole on her chest that recently began changing. It is now heterogenous in color, part it becoming dark and raised. She denies pain, itching, or bleeding. She does not have a h/o skin cancer. She does not currently have a dermatologist.       Review of Systems   Constitutional: Negative.    HENT: Negative.     Eyes: Negative.    Respiratory: Negative.     Cardiovascular: Negative.    Gastrointestinal: Negative.    Genitourinary: Negative.    Musculoskeletal: Negative.    Skin:  Positive for color change and rash.   Neurological: Negative.    Psychiatric/Behavioral: Negative.         No Known Allergies  Prior to Visit Medications    Medication Sig Taking? Authorizing Provider   aspirin 81 MG EC tablet Take 1 tablet by mouth daily Yes Provider, Historical,

## 2024-09-24 ENCOUNTER — NURSE ONLY (OUTPATIENT)
Dept: FAMILY MEDICINE CLINIC | Age: 64
End: 2024-09-24
Payer: COMMERCIAL

## 2024-09-24 ENCOUNTER — TELEMEDICINE (OUTPATIENT)
Age: 64
End: 2024-09-24
Payer: COMMERCIAL

## 2024-09-24 DIAGNOSIS — N30.00 ACUTE CYSTITIS WITHOUT HEMATURIA: Primary | ICD-10-CM

## 2024-09-24 DIAGNOSIS — R10.9 ABDOMINAL PAIN, UNSPECIFIED ABDOMINAL LOCATION: Primary | ICD-10-CM

## 2024-09-24 LAB
BILIRUBIN, POC: NEGATIVE
BLOOD URINE, POC: NEGATIVE
CLARITY, POC: CLEAR
COLOR, POC: YELLOW
GLUCOSE URINE, POC: NEGATIVE MG/DL
KETONES, POC: NEGATIVE MG/DL
LEUKOCYTE EST, POC: NEGATIVE
NITRITE, POC: NEGATIVE
PH, POC: 6
PROTEIN, POC: NEGATIVE MG/DL
SPECIFIC GRAVITY, POC: 1.01
UROBILINOGEN, POC: 0.2 MG/DL

## 2024-09-24 PROCEDURE — 81002 URINALYSIS NONAUTO W/O SCOPE: CPT | Performed by: NURSE PRACTITIONER

## 2024-09-24 PROCEDURE — 99213 OFFICE O/P EST LOW 20 MIN: CPT | Performed by: NURSE PRACTITIONER

## 2024-09-25 LAB — BACTERIA UR CULT: NORMAL

## 2024-09-26 ENCOUNTER — OFFICE VISIT (OUTPATIENT)
Dept: FAMILY MEDICINE CLINIC | Age: 64
End: 2024-09-26

## 2024-09-26 VITALS
TEMPERATURE: 97.4 F | DIASTOLIC BLOOD PRESSURE: 60 MMHG | HEIGHT: 63 IN | SYSTOLIC BLOOD PRESSURE: 120 MMHG | OXYGEN SATURATION: 98 % | HEART RATE: 71 BPM | WEIGHT: 133.6 LBS | BODY MASS INDEX: 23.67 KG/M2

## 2024-09-26 DIAGNOSIS — N83.201 RIGHT OVARIAN CYST: ICD-10-CM

## 2024-09-26 DIAGNOSIS — Z87.81 HISTORY OF COMPRESSION FRACTURE OF SPINE: ICD-10-CM

## 2024-09-26 DIAGNOSIS — D25.9 UTERINE LEIOMYOMA, UNSPECIFIED LOCATION: ICD-10-CM

## 2024-09-26 DIAGNOSIS — M54.41 ACUTE RIGHT-SIDED LOW BACK PAIN WITH RIGHT-SIDED SCIATICA: Primary | ICD-10-CM

## 2024-09-26 DIAGNOSIS — Z23 NEED FOR INFLUENZA VACCINATION: ICD-10-CM

## 2024-09-26 RX ORDER — METHYLPREDNISOLONE 4 MG
TABLET, DOSE PACK ORAL
Qty: 1 KIT | Refills: 0 | Status: SHIPPED | OUTPATIENT
Start: 2024-09-26 | End: 2024-10-02

## 2024-09-26 RX ORDER — METHOCARBAMOL 500 MG/1
500 TABLET, FILM COATED ORAL 4 TIMES DAILY
Qty: 40 TABLET | Refills: 0 | Status: SHIPPED | OUTPATIENT
Start: 2024-09-26 | End: 2024-10-06

## 2024-09-26 SDOH — ECONOMIC STABILITY: FOOD INSECURITY: WITHIN THE PAST 12 MONTHS, YOU WORRIED THAT YOUR FOOD WOULD RUN OUT BEFORE YOU GOT MONEY TO BUY MORE.: NEVER TRUE

## 2024-09-26 SDOH — ECONOMIC STABILITY: FOOD INSECURITY: WITHIN THE PAST 12 MONTHS, THE FOOD YOU BOUGHT JUST DIDN'T LAST AND YOU DIDN'T HAVE MONEY TO GET MORE.: NEVER TRUE

## 2024-09-26 SDOH — ECONOMIC STABILITY: INCOME INSECURITY: HOW HARD IS IT FOR YOU TO PAY FOR THE VERY BASICS LIKE FOOD, HOUSING, MEDICAL CARE, AND HEATING?: NOT HARD AT ALL

## 2024-09-28 ASSESSMENT — ENCOUNTER SYMPTOMS
BACK PAIN: 1
ABDOMINAL PAIN: 1

## 2024-10-04 ENCOUNTER — PATIENT MESSAGE (OUTPATIENT)
Dept: FAMILY MEDICINE CLINIC | Age: 64
End: 2024-10-04

## 2024-10-23 ENCOUNTER — TELEPHONE (OUTPATIENT)
Dept: FAMILY MEDICINE CLINIC | Age: 64
End: 2024-10-23

## 2024-10-23 NOTE — TELEPHONE ENCOUNTER
Nurse placed a call out to the patient to confirm that she had received her cologuard screening kit.  Called patient no answer, left a message for the patient to return the call to the office.

## 2024-12-22 LAB — NONINV COLON CA DNA+OCC BLD SCRN STL QL: POSITIVE

## 2024-12-31 ENCOUNTER — OFFICE VISIT (OUTPATIENT)
Dept: FAMILY MEDICINE CLINIC | Age: 64
End: 2024-12-31
Payer: COMMERCIAL

## 2024-12-31 VITALS
OXYGEN SATURATION: 98 % | WEIGHT: 133 LBS | DIASTOLIC BLOOD PRESSURE: 80 MMHG | HEART RATE: 70 BPM | SYSTOLIC BLOOD PRESSURE: 124 MMHG | HEIGHT: 63 IN | BODY MASS INDEX: 23.57 KG/M2

## 2024-12-31 DIAGNOSIS — H69.91 DYSFUNCTION OF RIGHT EUSTACHIAN TUBE: ICD-10-CM

## 2024-12-31 DIAGNOSIS — H93.8X1 EAR PRESSURE, RIGHT: ICD-10-CM

## 2024-12-31 DIAGNOSIS — J34.89 SINUS PRESSURE: Primary | ICD-10-CM

## 2024-12-31 PROCEDURE — 99213 OFFICE O/P EST LOW 20 MIN: CPT | Performed by: STUDENT IN AN ORGANIZED HEALTH CARE EDUCATION/TRAINING PROGRAM

## 2024-12-31 RX ORDER — METHYLPREDNISOLONE 4 MG/1
TABLET ORAL
Qty: 21 TABLET | Refills: 0 | Status: SHIPPED | OUTPATIENT
Start: 2024-12-31 | End: 2025-01-06

## 2024-12-31 NOTE — PROGRESS NOTES
Assessment:  Encounter Diagnoses   Name Primary?    Sinus pressure Yes    Ear pressure, right     Dysfunction of right eustachian tube        Plan:  1. Sinus pressure  -     methylPREDNISolone (MEDROL DOSEPACK) 4 MG tablet; Take by mouth., Disp-21 tablet, R-0Normal  2. Ear pressure, right  -     methylPREDNISolone (MEDROL DOSEPACK) 4 MG tablet; Take by mouth., Disp-21 tablet, R-0Normal  3. Dysfunction of right eustachian tube  -     methylPREDNISolone (MEDROL DOSEPACK) 4 MG tablet; Take by mouth., Disp-21 tablet, R-0Normal  No evidence of acute infection.  Has been utilizing Nasacort twice daily without improvement.  Will trial oral steroid for likely eustachian tube dysfunction.  No clear bulging of TM.  If not improving then consider ENT evaluation.      No follow-ups on file.      Patient: Yin Munroe is a 64 y.o. female who presents today with the following Chief Complaint(s):  Chief Complaint   Patient presents with    Ear Pain     R ear, week         HPI    Reports right ear pain x 8 days  Has tried sudafed, chronically on Nasacort but did increase to twice daily dosing for the last week  No sinus congestion, no headache  Current Outpatient Medications   Medication Sig Dispense Refill    methylPREDNISolone (MEDROL DOSEPACK) 4 MG tablet Take by mouth. 21 tablet 0    aspirin 81 MG EC tablet Take 1 tablet by mouth daily      levothyroxine (SYNTHROID) 75 MCG tablet TAKE ONE TABLET BY MOUTH DAILY 90 tablet 3    atorvastatin (LIPITOR) 20 MG tablet Take 1 tablet by mouth nightly 90 tablet 3    betamethasone dipropionate 0.05 % ointment APPLY TOPICALLY DAILY (Patient not taking: Reported on 12/31/2024) 45 g 0     No current facility-administered medications for this visit.       Patient's past medical history, surgical history, family history, medications,  andallergies  were all reviewed and updated as appropriate today.      Review of Systems  All other systems reviewed and negative    Physical Exam  Bilateral

## 2025-01-10 DIAGNOSIS — I70.90 ATHEROSCLEROSIS: ICD-10-CM

## 2025-01-10 RX ORDER — ATORVASTATIN CALCIUM 20 MG/1
20 TABLET, FILM COATED ORAL NIGHTLY
Qty: 15 TABLET | Refills: 0 | Status: SHIPPED | OUTPATIENT
Start: 2025-01-10

## 2025-01-10 NOTE — TELEPHONE ENCOUNTER
Last Office Visit: 7/18/23 Provider: ORESTES  **Is provider OOT? No    Next Office Visit: Visit date not found Provider:   **If no OV, when does pt need to be seen? in 1 year(s)    LAST LABS:   Lipid:   Lab Results   Component Value Date    CHOL 129 08/13/2024    TRIG 55 08/13/2024    HDL 49 08/13/2024    LDL 69 08/13/2024    VLDL 11 08/13/2024

## 2025-01-24 DIAGNOSIS — R19.5 POSITIVE COLORECTAL CANCER SCREENING USING COLOGUARD TEST: Primary | ICD-10-CM

## 2025-02-06 DIAGNOSIS — I70.90 ATHEROSCLEROSIS: ICD-10-CM

## 2025-02-06 RX ORDER — ATORVASTATIN CALCIUM 20 MG/1
20 TABLET, FILM COATED ORAL NIGHTLY
Qty: 90 TABLET | Refills: 1 | Status: SHIPPED | OUTPATIENT
Start: 2025-02-06

## 2025-04-08 NOTE — PROGRESS NOTES
Britta Mcrae RN     I, Dr. Opal Sidhu, personally performed the services described in this documentation, as scribed by the above signed scribe in my presence. It is both accurate and complete to my knowledge. I agree with the details independently gathered by the clinical support staff, while the remaining scribed note accurately describes my personal service to the patient.    Medical Decision Making:  The following items were considered in medical decision making:  Independent review of images  Review / order clinical lab tests  Review / order radiology tests  Decision to obtain old records  Review and summation of old records as accessed through [employer] (a summary of my findings in these old records)      Time Based Itemization  A total of 30 minutes was spent on today's patient encounter.  If applicable, non-patient-facing activities:  (X)Preparing to see the patient and reviewing records  (X) Individual interpretation of results  ( ) Discussion or coordination of care with other health care professionals  (X) Ordering of unique tests, medications, or procedures  (X) Documentation within the EHR            All questions and concerns were addressed to the patient/family. Alternatives to my treatment were discussed. The note was completed using EMR. Every effort was made to ensure accuracy; however, inadvertent computerized transcription errors may be present.    Opal Sidhu MD, MARY, [facility], [facility]  [phone] [facility]  [phone] [facility]  4/9/2025  9:41 AM

## 2025-04-09 ENCOUNTER — OFFICE VISIT (OUTPATIENT)
Age: 65
End: 2025-04-09
Payer: COMMERCIAL

## 2025-04-09 VITALS
HEART RATE: 72 BPM | WEIGHT: 128.4 LBS | BODY MASS INDEX: 22.75 KG/M2 | DIASTOLIC BLOOD PRESSURE: 68 MMHG | SYSTOLIC BLOOD PRESSURE: 124 MMHG | HEIGHT: 63 IN | OXYGEN SATURATION: 97 %

## 2025-04-09 DIAGNOSIS — E78.00 HYPERCHOLESTEROLEMIA: ICD-10-CM

## 2025-04-09 DIAGNOSIS — I65.23 ATHEROSCLEROSIS OF BOTH CAROTID ARTERIES: ICD-10-CM

## 2025-04-09 DIAGNOSIS — Z82.49 FAMILY HISTORY OF HEART DISEASE: Primary | ICD-10-CM

## 2025-04-09 PROCEDURE — 99214 OFFICE O/P EST MOD 30 MIN: CPT | Performed by: INTERNAL MEDICINE

## 2025-04-09 PROCEDURE — 93000 ELECTROCARDIOGRAM COMPLETE: CPT | Performed by: INTERNAL MEDICINE

## 2025-04-09 NOTE — PATIENT INSTRUCTIONS
Your provider has ordered testing for further evaluation.  An order/prescription has been included in your paper work.   To schedule outpatient testing, contact Central Scheduling by calling [employer] ([phone]).       GXT or Lexiscan Myoview Stress Test instructions:   -Allow 3-4 hours for the entire test to be complete.  -Nothing to eat or drink after midnight prior to testing. May take prescribed medications in morning with sip of water.  -Do not drink or eat anything containing caffeine 24 hours prior to test. This includes coffee, decaffeinated coffee, chocolate, soda, or tea.      Plan:  EKG reviewed  Order echocardiogram ~ family history, atherosclerosis   Patient given detailed instructions on addressing diet, regular exercise, weight control, smoking abstention, medication compliance, and stress minimization. The patient was provided written and verbal instructions regarding risk factor modification.    Great job on not smoking. Continue to avoid as this is risk factor for heart attack, stroke, and/or cancer.    Order GXT stress myoview ~ family history, atherosclerosis   6.   Lipids reviewed  7.   Follow up in 1 year

## 2025-07-07 DIAGNOSIS — E03.9 HYPOTHYROIDISM, UNSPECIFIED TYPE: ICD-10-CM

## 2025-07-07 RX ORDER — LEVOTHYROXINE SODIUM 75 UG/1
TABLET ORAL
Qty: 90 TABLET | Refills: 0 | Status: SHIPPED | OUTPATIENT
Start: 2025-07-07

## 2025-07-21 ENCOUNTER — TELEPHONE (OUTPATIENT)
Dept: FAMILY MEDICINE CLINIC | Age: 65
End: 2025-07-21

## 2025-07-21 NOTE — TELEPHONE ENCOUNTER
Left message for patient to return phone call to the office regarding open orders for    ELVER DIGITAL SCREEN W OR WO CAD BILATERAL [[id number]] (Order [id number])    DEXA BONE DENSITY AXIAL SKELETON [[id number]] (Order [id number])    US PELVIS COMPLETE [[id number]] (Order [id number])      Please help patient to schedule when she returns phone call

## 2025-08-04 DIAGNOSIS — I70.90 ATHEROSCLEROSIS: ICD-10-CM

## 2025-08-04 RX ORDER — ATORVASTATIN CALCIUM 20 MG/1
20 TABLET, FILM COATED ORAL NIGHTLY
Qty: 90 TABLET | Refills: 2 | Status: SHIPPED | OUTPATIENT
Start: 2025-08-04